# Patient Record
Sex: FEMALE | Race: WHITE | NOT HISPANIC OR LATINO | Employment: FULL TIME | URBAN - METROPOLITAN AREA
[De-identification: names, ages, dates, MRNs, and addresses within clinical notes are randomized per-mention and may not be internally consistent; named-entity substitution may affect disease eponyms.]

---

## 2023-04-10 LAB
COBALAMIN (VITAMIN B12) (PG/ML) IN SER/PLAS: 1028 PG/ML (ref 211–911)
FERRITIN (UG/LL) IN SER/PLAS: 165 UG/L (ref 8–150)
FOLATE (NG/ML) IN SER/PLAS: 9.2 NG/ML
HEPATITIS B VIRUS CORE AB (PRESENCE) IN SER/PLAS BY IMM: NONREACTIVE
HEPATITIS B VIRUS SURFACE AG PRESENCE IN SERUM: NONREACTIVE
IRON (UG/DL) IN SER/PLAS: 133 UG/DL (ref 35–150)
IRON BINDING CAPACITY (UG/DL) IN SER/PLAS: 358 UG/DL (ref 240–445)
IRON SATURATION (%) IN SER/PLAS: 37 % (ref 25–45)

## 2023-04-11 LAB
GENETICS TEST NAME-DATA CONVERSION: NORMAL
HEMOCHROMATOSIS INTERPRETATION: NORMAL
LAB MOLECULAR CA TECHNICAL NOTES: NORMAL
MISCELLANEUOUS TEST RESULT: NORMAL
NAME OF SENDOUT TEST: NORMAL

## 2023-06-01 ENCOUNTER — TELEPHONE (OUTPATIENT)
Dept: PRIMARY CARE | Facility: CLINIC | Age: 58
End: 2023-06-01
Payer: COMMERCIAL

## 2023-06-01 DIAGNOSIS — Z12.31 VISIT FOR SCREENING MAMMOGRAM: ICD-10-CM

## 2023-06-01 NOTE — TELEPHONE ENCOUNTER
Pt called requesting order for a mammogram. Upcoming apt July 2023. Pt would like a call when this is ready.

## 2023-07-07 ENCOUNTER — TELEPHONE (OUTPATIENT)
Dept: PRIMARY CARE | Facility: CLINIC | Age: 58
End: 2023-07-07
Payer: COMMERCIAL

## 2023-07-07 DIAGNOSIS — N89.8 VAGINAL CYST: ICD-10-CM

## 2023-07-07 NOTE — TELEPHONE ENCOUNTER
Pt called asking to speak with you.  She needs directions on the next step to a medical   Issue.  Please call

## 2023-07-07 NOTE — TELEPHONE ENCOUNTER
Spoke with pt, you had done a pa on her last year and pt noted to have cyst on rt labia. Recommendation was made to see gyn . Pt did not see gyn and called today stating that cyst has gotten bigger. Denies drainage , pain odor or redness. Referral placed for gyn. Numbers provided pt also advised if becomes bigger or changes needs to be evaluated at an ER Dr guzmán made aware

## 2023-07-18 DIAGNOSIS — E78.5 HYPERLIPIDEMIA, UNSPECIFIED HYPERLIPIDEMIA TYPE: ICD-10-CM

## 2023-07-18 DIAGNOSIS — Z00.00 ANNUAL PHYSICAL EXAM: ICD-10-CM

## 2023-07-18 RX ORDER — ATORVASTATIN CALCIUM 10 MG/1
1 TABLET, FILM COATED ORAL DAILY
COMMUNITY
Start: 2018-01-08 | End: 2023-07-25 | Stop reason: SDUPTHER

## 2023-07-18 RX ORDER — MONTELUKAST SODIUM 10 MG/1
1 TABLET ORAL DAILY
COMMUNITY
Start: 2013-04-12 | End: 2023-07-25 | Stop reason: SDUPTHER

## 2023-07-21 ENCOUNTER — LAB (OUTPATIENT)
Dept: LAB | Facility: LAB | Age: 58
End: 2023-07-21
Payer: COMMERCIAL

## 2023-07-21 DIAGNOSIS — E78.5 HYPERLIPIDEMIA, UNSPECIFIED HYPERLIPIDEMIA TYPE: ICD-10-CM

## 2023-07-21 DIAGNOSIS — Z00.00 ANNUAL PHYSICAL EXAM: ICD-10-CM

## 2023-07-21 LAB
ALANINE AMINOTRANSFERASE (SGPT) (U/L) IN SER/PLAS: 27 U/L (ref 7–45)
ALBUMIN (G/DL) IN SER/PLAS: 4.9 G/DL (ref 3.4–5)
ALKALINE PHOSPHATASE (U/L) IN SER/PLAS: 50 U/L (ref 33–110)
ANION GAP IN SER/PLAS: 15 MMOL/L (ref 10–20)
ASPARTATE AMINOTRANSFERASE (SGOT) (U/L) IN SER/PLAS: 22 U/L (ref 9–39)
BASOPHILS (10*3/UL) IN BLOOD BY AUTOMATED COUNT: 0.05 X10E9/L (ref 0–0.1)
BASOPHILS/100 LEUKOCYTES IN BLOOD BY AUTOMATED COUNT: 1.2 % (ref 0–2)
BILIRUBIN TOTAL (MG/DL) IN SER/PLAS: 0.6 MG/DL (ref 0–1.2)
CALCIUM (MG/DL) IN SER/PLAS: 10.5 MG/DL (ref 8.6–10.6)
CARBON DIOXIDE, TOTAL (MMOL/L) IN SER/PLAS: 28 MMOL/L (ref 21–32)
CHLORIDE (MMOL/L) IN SER/PLAS: 105 MMOL/L (ref 98–107)
CHOLESTEROL (MG/DL) IN SER/PLAS: 228 MG/DL (ref 0–199)
CHOLESTEROL IN HDL (MG/DL) IN SER/PLAS: 96.8 MG/DL
CHOLESTEROL/HDL RATIO: 2.4
CREATININE (MG/DL) IN SER/PLAS: 0.77 MG/DL (ref 0.5–1.05)
EOSINOPHILS (10*3/UL) IN BLOOD BY AUTOMATED COUNT: 0.3 X10E9/L (ref 0–0.7)
EOSINOPHILS/100 LEUKOCYTES IN BLOOD BY AUTOMATED COUNT: 7 % (ref 0–6)
ERYTHROCYTE DISTRIBUTION WIDTH (RATIO) BY AUTOMATED COUNT: 12.5 % (ref 11.5–14.5)
ERYTHROCYTE MEAN CORPUSCULAR HEMOGLOBIN CONCENTRATION (G/DL) BY AUTOMATED: 32.8 G/DL (ref 32–36)
ERYTHROCYTE MEAN CORPUSCULAR VOLUME (FL) BY AUTOMATED COUNT: 100 FL (ref 80–100)
ERYTHROCYTES (10*6/UL) IN BLOOD BY AUTOMATED COUNT: 4.68 X10E12/L (ref 4–5.2)
FERRITIN (UG/LL) IN SER/PLAS: 262 UG/L (ref 8–150)
GFR FEMALE: 89 ML/MIN/1.73M2
GLUCOSE (MG/DL) IN SER/PLAS: 104 MG/DL (ref 74–99)
HEMATOCRIT (%) IN BLOOD BY AUTOMATED COUNT: 46.6 % (ref 36–46)
HEMOGLOBIN (G/DL) IN BLOOD: 15.3 G/DL (ref 12–16)
IMMATURE GRANULOCYTES/100 LEUKOCYTES IN BLOOD BY AUTOMATED COUNT: 0.2 % (ref 0–0.9)
IRON (UG/DL) IN SER/PLAS: 153 UG/DL (ref 35–150)
IRON BINDING CAPACITY (UG/DL) IN SER/PLAS: 380 UG/DL (ref 240–445)
IRON SATURATION (%) IN SER/PLAS: 40 % (ref 25–45)
LDL: 120 MG/DL (ref 0–99)
LEUKOCYTES (10*3/UL) IN BLOOD BY AUTOMATED COUNT: 4.3 X10E9/L (ref 4.4–11.3)
LYMPHOCYTES (10*3/UL) IN BLOOD BY AUTOMATED COUNT: 2.14 X10E9/L (ref 1.2–4.8)
LYMPHOCYTES/100 LEUKOCYTES IN BLOOD BY AUTOMATED COUNT: 49.8 % (ref 13–44)
MONOCYTES (10*3/UL) IN BLOOD BY AUTOMATED COUNT: 0.47 X10E9/L (ref 0.1–1)
MONOCYTES/100 LEUKOCYTES IN BLOOD BY AUTOMATED COUNT: 10.9 % (ref 2–10)
NEUTROPHILS (10*3/UL) IN BLOOD BY AUTOMATED COUNT: 1.33 X10E9/L (ref 1.2–7.7)
NEUTROPHILS/100 LEUKOCYTES IN BLOOD BY AUTOMATED COUNT: 30.9 % (ref 40–80)
NRBC (PER 100 WBCS) BY AUTOMATED COUNT: 0 /100 WBC (ref 0–0)
PLATELETS (10*3/UL) IN BLOOD AUTOMATED COUNT: 215 X10E9/L (ref 150–450)
POTASSIUM (MMOL/L) IN SER/PLAS: 4.9 MMOL/L (ref 3.5–5.3)
PROTEIN TOTAL: 7.1 G/DL (ref 6.4–8.2)
SODIUM (MMOL/L) IN SER/PLAS: 143 MMOL/L (ref 136–145)
THYROTROPIN (MIU/L) IN SER/PLAS BY DETECTION LIMIT <= 0.05 MIU/L: 1.51 MIU/L (ref 0.44–3.98)
TRIGLYCERIDE (MG/DL) IN SER/PLAS: 54 MG/DL (ref 0–149)
UREA NITROGEN (MG/DL) IN SER/PLAS: 15 MG/DL (ref 6–23)
VLDL: 11 MG/DL (ref 0–40)

## 2023-07-21 PROCEDURE — 84443 ASSAY THYROID STIM HORMONE: CPT

## 2023-07-21 PROCEDURE — 36415 COLL VENOUS BLD VENIPUNCTURE: CPT

## 2023-07-21 PROCEDURE — 80061 LIPID PANEL: CPT

## 2023-07-21 PROCEDURE — 80053 COMPREHEN METABOLIC PANEL: CPT

## 2023-07-21 PROCEDURE — 85025 COMPLETE CBC W/AUTO DIFF WBC: CPT

## 2023-07-25 ENCOUNTER — OFFICE VISIT (OUTPATIENT)
Dept: PRIMARY CARE | Facility: CLINIC | Age: 58
End: 2023-07-25
Payer: COMMERCIAL

## 2023-07-25 VITALS
WEIGHT: 138.5 LBS | BODY MASS INDEX: 20.51 KG/M2 | RESPIRATION RATE: 16 BRPM | HEIGHT: 69 IN | DIASTOLIC BLOOD PRESSURE: 70 MMHG | SYSTOLIC BLOOD PRESSURE: 118 MMHG | TEMPERATURE: 98.8 F | HEART RATE: 72 BPM

## 2023-07-25 DIAGNOSIS — J30.9 ALLERGIC RHINITIS, UNSPECIFIED SEASONALITY, UNSPECIFIED TRIGGER: ICD-10-CM

## 2023-07-25 DIAGNOSIS — Z00.00 ROUTINE GENERAL MEDICAL EXAMINATION AT A HEALTH CARE FACILITY: Primary | ICD-10-CM

## 2023-07-25 DIAGNOSIS — K58.9 IRRITABLE BOWEL SYNDROME, UNSPECIFIED TYPE: ICD-10-CM

## 2023-07-25 DIAGNOSIS — Z12.4 ENCOUNTER FOR PAPANICOLAOU SMEAR FOR CERVICAL CANCER SCREENING: ICD-10-CM

## 2023-07-25 DIAGNOSIS — E78.5 HYPERLIPIDEMIA, UNSPECIFIED HYPERLIPIDEMIA TYPE: ICD-10-CM

## 2023-07-25 PROBLEM — E83.110 HEREDITARY HEMOCHROMATOSIS (CMS-HCC): Status: ACTIVE | Noted: 2023-07-25

## 2023-07-25 PROBLEM — C43.9 MELANOMA (MULTI): Status: ACTIVE | Noted: 2023-07-25

## 2023-07-25 PROBLEM — R79.9 ABNORMAL BLOOD CHEMISTRY: Status: ACTIVE | Noted: 2023-07-25

## 2023-07-25 PROCEDURE — 88175 CYTOPATH C/V AUTO FLUID REDO: CPT

## 2023-07-25 PROCEDURE — 1036F TOBACCO NON-USER: CPT | Performed by: INTERNAL MEDICINE

## 2023-07-25 PROCEDURE — 99396 PREV VISIT EST AGE 40-64: CPT | Performed by: INTERNAL MEDICINE

## 2023-07-25 RX ORDER — DICYCLOMINE HYDROCHLORIDE 20 MG/1
20 TABLET ORAL 3 TIMES DAILY
Qty: 90 TABLET | Refills: 1 | Status: SHIPPED | OUTPATIENT
Start: 2023-07-25

## 2023-07-25 RX ORDER — ATORVASTATIN CALCIUM 10 MG/1
10 TABLET, FILM COATED ORAL DAILY
Qty: 90 TABLET | Refills: 1 | Status: SHIPPED | OUTPATIENT
Start: 2023-07-25 | End: 2024-01-19 | Stop reason: SDUPTHER

## 2023-07-25 RX ORDER — MONTELUKAST SODIUM 10 MG/1
10 TABLET ORAL DAILY
Qty: 90 TABLET | Refills: 1 | Status: SHIPPED | OUTPATIENT
Start: 2023-07-25 | End: 2024-01-19 | Stop reason: SDUPTHER

## 2023-07-25 RX ORDER — DICYCLOMINE HYDROCHLORIDE 20 MG/1
20 TABLET ORAL 3 TIMES DAILY
COMMUNITY
Start: 2019-10-30 | End: 2023-07-25 | Stop reason: SDUPTHER

## 2023-07-25 ASSESSMENT — PATIENT HEALTH QUESTIONNAIRE - PHQ9
2. FEELING DOWN, DEPRESSED OR HOPELESS: NOT AT ALL
SUM OF ALL RESPONSES TO PHQ9 QUESTIONS 1 AND 2: 0
1. LITTLE INTEREST OR PLEASURE IN DOING THINGS: NOT AT ALL

## 2023-07-25 ASSESSMENT — COLUMBIA-SUICIDE SEVERITY RATING SCALE - C-SSRS
6. HAVE YOU EVER DONE ANYTHING, STARTED TO DO ANYTHING, OR PREPARED TO DO ANYTHING TO END YOUR LIFE?: NO
1. IN THE PAST MONTH, HAVE YOU WISHED YOU WERE DEAD OR WISHED YOU COULD GO TO SLEEP AND NOT WAKE UP?: NO
2. HAVE YOU ACTUALLY HAD ANY THOUGHTS OF KILLING YOURSELF?: NO

## 2023-07-25 NOTE — PROGRESS NOTES
"Subjective   Patient ID: Alessandra Beltran is a 57 y.o. female who presents for Annual Exam (With pap and breast exam).  HPI  Patient is here for annual check-up    Last well check 1 yr  Gets reg melanoma follow up  Reported health good    Dental  check  reg     Vision check reg   Vision issues reg  Hearing issues no  Vaccines UTD  y    Diet good  Exercise regular  Caffeine am 2 c  Alcohol yes  Tobacco never     Colon cancer screening  8/2021    Current issues:  saw gyn and had cyst emoved from right labiua there x yrs but it grew      Review of Systems  GENERAL - Denies fever, fatigue or chills  SKIN - Denies rash, new skin lesions, or change in moles  EYES - Denies blurred vision, or change in visual acuity  EARS - Denies ear pain, discharge, ringing, or difficulty hearing  NOSE - Denies nasal congestion, discharge, or bleeding  MOUTH - Denies sore throat, postnasal drip or painful/difficulty swallowing  NECK - Denies pain or swelling  RESPIRATORY - Denies shortness of breath, cough, wheezing  CARDIOVASCULAR - Denies palpitations, chest pain, orthopnea, peripheral edema, syncope or claudication  GASTROINTESTINAL - Denies nausea, vomiting, diarrhea, constipation, abdominal pain, melena and or bright red blood  GENITOURINARY - Denies dysuria, frequency of urination, urgency, or hesitancy  MUSCULOSKELETAL - Denies joint or muscle pain, or back pain  NEUROLOGICAL - Denies localized numbness, weakness, or tingling  PSYCHIATRIC - Denies depression, anxiety, substance abuse, suicidal or homicidal ideation  ENDOCRINE - Denies heat or cold intolerance, weight loss or gain, increasing thirst  HEMATO-IMMUNOLOGIC - Denies easy bruising, bleeding, oral ulcerations or recurrent infections      Objective   /70   Pulse 72   Temp 37.1 °C (98.8 °F)   Resp 16   Ht 1.746 m (5' 8.75\")   Wt 62.8 kg (138 lb 8 oz)   BMI 20.60 kg/m²    Physical Exam  CONSTITUTIONAL - well nourished, well developed, looks like stated age, in " no acute distress, not ill-appearing, and not tired appearing  SKIN -notable scars from melanoma resection on her right labia she has a nodule at the site of the resection of her leiomyoma   HEAD - no trauma, normocephalic  EYES -normal  ENT - TM's intact, no injection, no exudate,  nasal passage without discharge and patent  NECK - supple without rigidity, no neck mass was observed, no thyromegaly or thyroid nodules  CHEST - clear to auscultation, no wheezing, no crackles and no rales, good effort  Breast normal bilaterally no masses no axillary masses  CARDIAC - regular rate and regular rhythm, no skipped beats, no murmur  ABDOMEN - no organomegaly, soft, nontender, nondistended, normal bowel sounds, no guarding/rebound/rigidity  External/internal genitalia normal cervix normal bimanual normal  EXTREMITIES - no edema, no deformities  NEUROLOGICAL -grossly intact  PSYCHIATRIC - alert, pleasant and cordial, age-appropriate  LYMPHATIC- no cervical lymphadenopathy    Assessment/Plan   Diagnoses and all orders for this visit:  Routine general medical examination at a health care facility  Hyperlipidemia, unspecified hyperlipidemia type  -     atorvastatin (Lipitor) 10 mg tablet; Take 1 tablet (10 mg) by mouth once daily.  -     Lipid Panel; Future  -     Comprehensive Metabolic Panel; Future  Allergic rhinitis, unspecified seasonality, unspecified trigger  -     montelukast (Singulair) 10 mg tablet; Take 1 tablet (10 mg) by mouth once daily.  Irritable bowel syndrome, unspecified type  -     dicyclomine (Bentyl) 20 mg tablet; Take 1 tablet (20 mg) by mouth 3 times a day.    Call with issues  Follow-up with me in 6 months  Blood work order written  She will follow-up with gynecologist next year for Pap  We reviewed her mammo  She is up-to-date on her colonoscopy  Charity Mccallum MD

## 2023-08-02 LAB
COMPLETE PATHOLOGY REPORT: NORMAL
CONVERTED CLINICAL DIAGNOSIS-HISTORY: NORMAL
CONVERTED DIAGNOSIS COMMENT: NORMAL
CONVERTED FINAL DIAGNOSIS: NORMAL
CONVERTED FINAL REPORT PDF LINK TO COPY AND PASTE: NORMAL

## 2023-10-23 ENCOUNTER — TELEPHONE (OUTPATIENT)
Dept: HEMATOLOGY/ONCOLOGY | Facility: CLINIC | Age: 58
End: 2023-10-23
Payer: COMMERCIAL

## 2023-10-23 NOTE — TELEPHONE ENCOUNTER
Patient received yellow jacket stings on Friday.  On prednisone and doxycycline due to infection per patient.  Day 4/9.  Scheduled for labs tomorrow and follow up on 11/7.  Wanted to know if any labs would be skewed from these two meds.  After d/w patient she will have labs done next week since appt following week.  Call back instructions reviewed.  Patient verbalized understanding.

## 2023-10-23 NOTE — TELEPHONE ENCOUNTER
Dr. Chamorro patient. Patient called is questioning if she should have the fasting labs tomorrow because she is on steroids and a antibiotic from her PCP and didn't know if it would change the outcome of the labs. Please call her @ [phone]

## 2023-10-30 PROBLEM — L91.8 SKIN TAG: Status: ACTIVE | Noted: 2018-08-06

## 2023-10-30 PROBLEM — L72.3 SEBACEOUS CYST: Status: ACTIVE | Noted: 2019-02-14

## 2023-10-30 PROBLEM — K76.9 LIVER LESION: Status: ACTIVE | Noted: 2023-10-30

## 2023-10-30 PROBLEM — L70.9 ACNE, UNSPECIFIED: Status: ACTIVE | Noted: 2018-08-06

## 2023-10-30 PROBLEM — D23.9 DERMATOFIBROMA: Status: ACTIVE | Noted: 2018-08-06

## 2023-10-30 PROBLEM — R05.9 COUGH: Status: ACTIVE | Noted: 2023-10-30

## 2023-10-30 PROBLEM — D03.62 MELANOMA IN SITU OF LEFT UPPER LIMB, INCLUDING SHOULDER (MULTI): Status: ACTIVE | Noted: 2018-08-06

## 2023-10-30 PROBLEM — L81.4 OTHER MELANIN HYPERPIGMENTATION: Status: ACTIVE | Noted: 2018-08-06

## 2023-10-30 PROBLEM — Z12.83 SCREENING FOR MALIGNANT NEOPLASM OF SKIN: Status: ACTIVE | Noted: 2018-08-06

## 2023-10-30 PROBLEM — L81.4 LENTIGINES: Status: ACTIVE | Noted: 2018-08-06

## 2023-10-30 PROBLEM — R79.89 ABNORMAL CBC: Status: ACTIVE | Noted: 2023-10-30

## 2023-10-30 PROBLEM — L82.1 SEBORRHEIC KERATOSIS: Status: ACTIVE | Noted: 2018-08-06

## 2023-10-30 PROBLEM — R19.5 LOOSE STOOLS: Status: ACTIVE | Noted: 2023-10-30

## 2023-10-30 PROBLEM — D18.00 ANGIOMA: Status: ACTIVE | Noted: 2018-08-06

## 2023-10-30 PROBLEM — D22.70 MELANOCYTIC NEVI OF UNSPECIFIED LOWER LIMB, INCLUDING HIP: Status: ACTIVE | Noted: 2018-08-06

## 2023-10-30 PROBLEM — D36.7 BENIGN NEOPLASM OF TRUNK: Status: ACTIVE | Noted: 2018-08-06

## 2023-10-30 PROBLEM — D36.7 BENIGN NEOPLASM OF LOWER EXTREMITY: Status: ACTIVE | Noted: 2018-08-06

## 2023-10-30 PROBLEM — L90.5 SCAR CONDITION AND FIBROSIS OF SKIN: Status: ACTIVE | Noted: 2018-08-06

## 2023-10-30 PROBLEM — Z85.820 HISTORY OF MELANOMA: Status: ACTIVE | Noted: 2018-08-06

## 2023-10-30 PROBLEM — D36.7 BENIGN NEOPLASM OF UPPER EXTREMITY: Status: ACTIVE | Noted: 2018-08-06

## 2023-10-30 PROBLEM — D36.7 BENIGN NEOPLASM OF FACE: Status: ACTIVE | Noted: 2018-08-06

## 2023-10-30 PROBLEM — J30.9 ALLERGIC RHINITIS: Status: ACTIVE | Noted: 2023-10-30

## 2023-10-30 PROBLEM — D22.62 MELANOCYTIC NEVI OF LEFT UPPER LIMB, INCLUDING SHOULDER: Status: ACTIVE | Noted: 2018-08-06

## 2023-10-30 PROBLEM — J01.90 ACUTE SINUSITIS: Status: ACTIVE | Noted: 2023-10-30

## 2023-10-30 PROBLEM — M65.30 TRIGGER FINGER: Status: ACTIVE | Noted: 2023-10-30

## 2023-10-30 RX ORDER — SULFAMETHOXAZOLE AND TRIMETHOPRIM 800; 160 MG/1; MG/1
1 TABLET ORAL 2 TIMES DAILY
COMMUNITY
Start: 2023-07-13 | End: 2024-01-19 | Stop reason: ALTCHOICE

## 2023-10-30 RX ORDER — ACETAMINOPHEN, DEXTROMETHORPHAN HBR, DOXYLAMINE SUCCINATE, PHENYLEPHRINE HCL 650; 20; 12.5; 1 MG/30ML; MG/30ML; MG/30ML; MG/30ML
1 SOLUTION ORAL DAILY
COMMUNITY

## 2023-10-30 RX ORDER — ACETAMINOPHEN 500 MG
2000 TABLET ORAL DAILY
COMMUNITY

## 2023-10-30 RX ORDER — MOMETASONE FUROATE 50 UG/1
1 SPRAY, METERED NASAL DAILY
COMMUNITY

## 2023-10-30 RX ORDER — CIPROFLOXACIN 500 MG/1
500 TABLET ORAL 2 TIMES DAILY
COMMUNITY
Start: 2023-08-18 | End: 2023-08-28

## 2023-10-30 RX ORDER — TRIAMCINOLONE ACETONIDE 55 UG/1
1 SPRAY, METERED NASAL DAILY
COMMUNITY
Start: 2014-11-10 | End: 2024-01-19 | Stop reason: ALTCHOICE

## 2023-10-30 RX ORDER — BIOTIN 1 MG
1000 TABLET ORAL DAILY
COMMUNITY
End: 2024-01-19 | Stop reason: ALTCHOICE

## 2023-10-30 RX ORDER — SULFACETAMIDE SODIUM, SULFUR 100; 50 MG/G; MG/G
1 EMULSION TOPICAL DAILY
COMMUNITY
Start: 2015-07-20 | End: 2024-01-19 | Stop reason: ALTCHOICE

## 2023-10-30 RX ORDER — PREDNISONE 10 MG/1
10 TABLET ORAL DAILY
COMMUNITY
Start: 2023-10-20 | End: 2024-01-19 | Stop reason: ALTCHOICE

## 2023-10-30 RX ORDER — DOXYCYCLINE 100 MG/1
100 CAPSULE ORAL 2 TIMES DAILY
COMMUNITY
Start: 2023-10-20 | End: 2024-01-19 | Stop reason: ALTCHOICE

## 2023-10-30 RX ORDER — CALCIUM CARBONATE 600 MG
600 TABLET ORAL AS NEEDED
COMMUNITY

## 2023-10-30 RX ORDER — MOMETASONE FUROATE 50 UG/1
2 SPRAY, METERED NASAL AS NEEDED
COMMUNITY
End: 2024-01-19 | Stop reason: DRUGHIGH

## 2023-10-31 ENCOUNTER — LAB (OUTPATIENT)
Dept: LAB | Facility: LAB | Age: 58
End: 2023-10-31
Payer: COMMERCIAL

## 2023-10-31 DIAGNOSIS — D75.89 OTHER SPECIFIED DISEASES OF BLOOD AND BLOOD-FORMING ORGANS: Primary | ICD-10-CM

## 2023-10-31 DIAGNOSIS — E83.19 OTHER DISORDERS OF IRON METABOLISM: ICD-10-CM

## 2023-10-31 DIAGNOSIS — E83.110 HEREDITARY HEMOCHROMATOSIS (CMS-HCC): ICD-10-CM

## 2023-10-31 LAB
BASOPHILS # BLD AUTO: 0.03 X10*3/UL (ref 0–0.1)
BASOPHILS NFR BLD AUTO: 0.6 %
EOSINOPHIL # BLD AUTO: 0.11 X10*3/UL (ref 0–0.7)
EOSINOPHIL NFR BLD AUTO: 2.3 %
ERYTHROCYTE [DISTWIDTH] IN BLOOD BY AUTOMATED COUNT: 12.5 % (ref 11.5–14.5)
FERRITIN SERPL-MCNC: 222 NG/ML (ref 8–150)
HCT VFR BLD AUTO: 46.7 % (ref 36–46)
HGB BLD-MCNC: 15.4 G/DL (ref 12–16)
IMM GRANULOCYTES # BLD AUTO: 0.02 X10*3/UL (ref 0–0.7)
IMM GRANULOCYTES NFR BLD AUTO: 0.4 % (ref 0–0.9)
IRON SATN MFR SERPL: 45 % (ref 25–45)
IRON SERPL-MCNC: 161 UG/DL (ref 35–150)
LYMPHOCYTES # BLD AUTO: 2.22 X10*3/UL (ref 1.2–4.8)
LYMPHOCYTES NFR BLD AUTO: 46.8 %
MCH RBC QN AUTO: 33.3 PG (ref 26–34)
MCHC RBC AUTO-ENTMCNC: 33 G/DL (ref 32–36)
MCV RBC AUTO: 101 FL (ref 80–100)
MONOCYTES # BLD AUTO: 0.42 X10*3/UL (ref 0.1–1)
MONOCYTES NFR BLD AUTO: 8.9 %
NEUTROPHILS # BLD AUTO: 1.94 X10*3/UL (ref 1.2–7.7)
NEUTROPHILS NFR BLD AUTO: 41 %
NRBC BLD-RTO: 0 /100 WBCS (ref 0–0)
PLATELET # BLD AUTO: 201 X10*3/UL (ref 150–450)
PMV BLD AUTO: 10 FL (ref 7.5–11.5)
RBC # BLD AUTO: 4.63 X10*6/UL (ref 4–5.2)
TIBC SERPL-MCNC: 355 UG/DL (ref 240–445)
UIBC SERPL-MCNC: 194 UG/DL (ref 110–370)
WBC # BLD AUTO: 4.7 X10*3/UL (ref 4.4–11.3)

## 2023-10-31 PROCEDURE — 83550 IRON BINDING TEST: CPT

## 2023-10-31 PROCEDURE — 83540 ASSAY OF IRON: CPT

## 2023-10-31 PROCEDURE — 36415 COLL VENOUS BLD VENIPUNCTURE: CPT

## 2023-10-31 PROCEDURE — 82728 ASSAY OF FERRITIN: CPT

## 2023-10-31 PROCEDURE — 85025 COMPLETE CBC W/AUTO DIFF WBC: CPT

## 2023-11-03 DIAGNOSIS — E83.110 HEREDITARY HEMOCHROMATOSIS (CMS-HCC): Primary | ICD-10-CM

## 2023-11-03 RX ORDER — FAMOTIDINE 10 MG/ML
20 INJECTION INTRAVENOUS ONCE AS NEEDED
OUTPATIENT
Start: 2023-11-07

## 2023-11-03 RX ORDER — EPINEPHRINE 0.3 MG/.3ML
0.3 INJECTION SUBCUTANEOUS EVERY 5 MIN PRN
OUTPATIENT
Start: 2023-11-07

## 2023-11-03 RX ORDER — ALBUTEROL SULFATE 0.83 MG/ML
3 SOLUTION RESPIRATORY (INHALATION) AS NEEDED
OUTPATIENT
Start: 2023-11-07

## 2023-11-03 RX ORDER — DIPHENHYDRAMINE HYDROCHLORIDE 50 MG/ML
50 INJECTION INTRAMUSCULAR; INTRAVENOUS AS NEEDED
OUTPATIENT
Start: 2023-11-07

## 2023-11-07 ENCOUNTER — INFUSION (OUTPATIENT)
Dept: HEMATOLOGY/ONCOLOGY | Facility: CLINIC | Age: 58
End: 2023-11-07
Payer: COMMERCIAL

## 2023-11-07 ENCOUNTER — OFFICE VISIT (OUTPATIENT)
Dept: HEMATOLOGY/ONCOLOGY | Facility: CLINIC | Age: 58
End: 2023-11-07
Payer: COMMERCIAL

## 2023-11-07 VITALS
BODY MASS INDEX: 20.9 KG/M2 | HEART RATE: 72 BPM | TEMPERATURE: 97.2 F | SYSTOLIC BLOOD PRESSURE: 150 MMHG | OXYGEN SATURATION: 99 % | HEIGHT: 69 IN | DIASTOLIC BLOOD PRESSURE: 92 MMHG | WEIGHT: 141.09 LBS

## 2023-11-07 DIAGNOSIS — E83.110 HEREDITARY HEMOCHROMATOSIS (CMS-HCC): ICD-10-CM

## 2023-11-07 DIAGNOSIS — E83.110 HEREDITARY HEMOCHROMATOSIS (CMS-HCC): Primary | ICD-10-CM

## 2023-11-07 PROCEDURE — 99213 OFFICE O/P EST LOW 20 MIN: CPT | Performed by: INTERNAL MEDICINE

## 2023-11-07 PROCEDURE — 1036F TOBACCO NON-USER: CPT | Performed by: INTERNAL MEDICINE

## 2023-11-07 ASSESSMENT — PATIENT HEALTH QUESTIONNAIRE - PHQ9
9. THOUGHTS THAT YOU WOULD BE BETTER OFF DEAD, OR OF HURTING YOURSELF: NOT AT ALL
4. FEELING TIRED OR HAVING LITTLE ENERGY: NOT AT ALL
8. MOVING OR SPEAKING SO SLOWLY THAT OTHER PEOPLE COULD HAVE NOTICED. OR THE OPPOSITE, BEING SO FIGETY OR RESTLESS THAT YOU HAVE BEEN MOVING AROUND A LOT MORE THAN USUAL: 0
7. TROUBLE CONCENTRATING ON THINGS, SUCH AS READING THE NEWSPAPER OR WATCHING TELEVISION: NOT AT ALL
5. POOR APPETITE OR OVEREATING: NOT AT ALL
9. THOUGHTS THAT YOU WOULD BE BETTER OFF DEAD, OR OF HURTING YOURSELF: 0
8. MOVING OR SPEAKING SO SLOWLY THAT OTHER PEOPLE COULD HAVE NOTICED. OR THE OPPOSITE, BEING SO FIGETY OR RESTLESS THAT YOU HAVE BEEN MOVING AROUND A LOT MORE THAN USUAL: NOT AT ALL
SUM OF ALL RESPONSES TO PHQ QUESTIONS 1-9: 0
2. FEELING DOWN, DEPRESSED OR HOPELESS: NOT AT ALL
3. TROUBLE FALLING OR STAYING ASLEEP OR SLEEPING TOO MUCH: NOT AT ALL
2. FEELING DOWN, DEPRESSED, IRRITABLE, OR HOPELESS: 0
5. POOR APPETITE OR OVEREATING: NOT AT ALL
3. TROUBLE FALLING OR STAYING ASLEEP OR SLEEPING TOO MUCH: NOT AT ALL
6. FEELING BAD ABOUT YOURSELF - OR THAT YOU ARE A FAILURE OR HAVE LET YOURSELF OR YOUR FAMILY DOWN: NOT AT ALL
4. FEELING TIRED OR HAVING LITTLE ENERGY: NOT AT ALL
5. POOR APPETITE OR OVEREATING: 0
6. FEELING BAD ABOUT YOURSELF - OR THAT YOU ARE A FAILURE OR HAVE LET YOURSELF OR YOUR FAMILY DOWN: NOT AT ALL
1. LITTLE INTEREST OR PLEASURE IN DOING THINGS: NOT AT ALL
7. TROUBLE CONCENTRATING ON THINGS, SUCH AS READING THE NEWSPAPER OR WATCHING TELEVISION: NOT AT ALL
6. FEELING BAD ABOUT YOURSELF - OR THAT YOU ARE A FAILURE OR HAVE LET YOURSELF OR YOUR FAMILY DOWN: 0
8. MOVING OR SPEAKING SO SLOWLY THAT OTHER PEOPLE COULD HAVE NOTICED. OR THE OPPOSITE, BEING SO FIGETY OR RESTLESS THAT YOU HAVE BEEN MOVING AROUND A LOT MORE THAN USUAL: NOT AT ALL
1. LITTLE INTEREST OR PLEASURE IN DOING THINGS: 0
2. FEELING DOWN, DEPRESSED, IRRITABLE, OR HOPELESS: NOT AT ALL
2. FEELING DOWN, DEPRESSED OR HOPELESS: NOT AT ALL
SUM OF ALL RESPONSES TO PHQ9 QUESTIONS 1 AND 2: 0
SUM OF ALL RESPONSES TO PHQ QUESTIONS 1-9: 0
1. LITTLE INTEREST OR PLEASURE IN DOING THINGS: NOT AT ALL
4. FEELING TIRED OR HAVING LITTLE ENERGY: NOT AT ALL
9. THOUGHTS THAT YOU WOULD BE BETTER OFF DEAD, OR OF HURTING YOURSELF: NOT AT ALL
4. FEELING TIRED OR HAVING LITTLE ENERGY: 0
5. POOR APPETITE OR OVEREATING: 0
6. FEELING BAD ABOUT YOURSELF - OR THAT YOU ARE A FAILURE OR HAVE LET YOURSELF OR YOUR FAMILY DOWN: NOT AT ALL
6. FEELING BAD ABOUT YOURSELF - OR THAT YOU ARE A FAILURE OR HAVE LET YOURSELF OR YOUR FAMILY DOWN: 0
8. MOVING OR SPEAKING SO SLOWLY THAT OTHER PEOPLE COULD HAVE NOTICED. OR THE OPPOSITE, BEING SO FIGETY OR RESTLESS THAT YOU HAVE BEEN MOVING AROUND A LOT MORE THAN USUAL: 0
SUM OF ALL RESPONSES TO PHQ QUESTIONS 1-9: 0
7. TROUBLE CONCENTRATING ON THINGS, SUCH AS READING THE NEWSPAPER OR WATCHING TELEVISION: NOT AT ALL
1. LITTLE INTEREST OR PLEASURE IN DOING THINGS: NOT AT ALL
2. FEELING DOWN, DEPRESSED, IRRITABLE, OR HOPELESS: NOT AT ALL
2. FEELING DOWN, DEPRESSED, IRRITABLE, OR HOPELESS: 0
4. FEELING TIRED OR HAVING LITTLE ENERGY: NOT AT ALL
5. POOR APPETITE OR OVEREATING: NOT AT ALL
7. TROUBLE CONCENTRATING ON THINGS, SUCH AS READING THE NEWSPAPER OR WATCHING TELEVISION: NOT AT ALL
3. TROUBLE FALLING OR STAYING ASLEEP OR SLEEPING TOO MUCH: NOT AT ALL
5. POOR APPETITE OR OVEREATING: NOT AT ALL
3. TROUBLE FALLING OR STAYING ASLEEP OR SLEEPING TOO MUCH: NOT AT ALL
9. THOUGHTS THAT YOU WOULD BE BETTER OFF DEAD, OR OF HURTING YOURSELF: 0
7. TROUBLE CONCENTRATING ON THINGS, SUCH AS READING THE NEWSPAPER OR WATCHING TELEVISION: 0
4. FEELING TIRED OR HAVING LITTLE ENERGY: 0
3. TROUBLE FALLING OR STAYING ASLEEP OR SLEEPING TOO MUCH: 0
SUM OF ALL RESPONSES TO PHQ QUESTIONS 1-9: 0
6. FEELING BAD ABOUT YOURSELF - OR THAT YOU ARE A FAILURE OR HAVE LET YOURSELF OR YOUR FAMILY DOWN: NOT AT ALL
3. TROUBLE FALLING OR STAYING ASLEEP OR SLEEPING TOO MUCH: 0
2. FEELING DOWN, DEPRESSED OR HOPELESS: NOT AT ALL
7. TROUBLE CONCENTRATING ON THINGS, SUCH AS READING THE NEWSPAPER OR WATCHING TELEVISION: 0
8. MOVING OR SPEAKING SO SLOWLY THAT OTHER PEOPLE COULD HAVE NOTICED. OR THE OPPOSITE, BEING SO FIGETY OR RESTLESS THAT YOU HAVE BEEN MOVING AROUND A LOT MORE THAN USUAL: NOT AT ALL
1. LITTLE INTEREST OR PLEASURE IN DOING THINGS: 0
9. THOUGHTS THAT YOU WOULD BE BETTER OFF DEAD, OR OF HURTING YOURSELF: NOT AT ALL
8. MOVING OR SPEAKING SO SLOWLY THAT OTHER PEOPLE COULD HAVE NOTICED. OR THE OPPOSITE, BEING SO FIGETY OR RESTLESS THAT YOU HAVE BEEN MOVING AROUND A LOT MORE THAN USUAL: NOT AT ALL
9. THOUGHTS THAT YOU WOULD BE BETTER OFF DEAD, OR OF HURTING YOURSELF: NOT AT ALL

## 2023-11-07 ASSESSMENT — COLUMBIA-SUICIDE SEVERITY RATING SCALE - C-SSRS
1. IN THE PAST MONTH, HAVE YOU WISHED YOU WERE DEAD OR WISHED YOU COULD GO TO SLEEP AND NOT WAKE UP?: NO
6. HAVE YOU EVER DONE ANYTHING, STARTED TO DO ANYTHING, OR PREPARED TO DO ANYTHING TO END YOUR LIFE?: NO
2. HAVE YOU ACTUALLY HAD ANY THOUGHTS OF KILLING YOURSELF?: NO

## 2023-11-07 ASSESSMENT — PAIN SCALES - GENERAL: PAINLEVEL: 0-NO PAIN

## 2023-11-07 ASSESSMENT — ENCOUNTER SYMPTOMS
LOSS OF SENSATION IN FEET: 0
OCCASIONAL FEELINGS OF UNSTEADINESS: 0
DEPRESSION: 0

## 2023-11-07 NOTE — PROGRESS NOTES
No phlebotomy today.  Patient reminded to follow-up with PCP and monitor BP at home.  Follow-up in 6 months with labs prior outside.  Call back instructions reviewed.  Patient verbalized understanding.

## 2023-11-07 NOTE — PROGRESS NOTES
Patient ID: Alessandra Beltran is a 57 y.o. female.  Referring Physician: No referring provider defined for this encounter.  Primary Care Provider: Charity Mccallum MD        Patient Instructions:   Patient was advised to monitor blood pressure at home and follow-up with primary care physician.    no Phlebotomy at this time   recheck CBC, iron group and ferritin (fasting) in 6 months  Return for follow-up in 2 days after lab work       ASSESSMENT, PROBLEM LIST, DECISION MAKING, PLAN.    1. Hereditary hemochromatosis/heterozygous C282 Y mutation diagnosed in April 2023 patient is on watchful waiting As her ferritin is 165      PAST MEDICAL HISTORY:    Recurrent superficial melanoma for which she had several excisions going back to 2010 when it was at  and recently has been followed by dermatologist at [facility], history of basal cell skin cancer removed, peptic ulcer disease, false positive hepatitis C in the past but it was confirmatory test hepatitis C negative at  in January 2018 repeat hepatitis C testing in April 2022 was negative, history of anxiety,          INTERVAL HISTORY :   Patient returns today for follow-up after she had a work-up done for hemochromatosis.          PHYSICAL EXAM:  General: Conscious, alert, oriented x3, not in acute distress.  HEENT:    No icterus.    Chest:Bilateral symmetrical,     CVS:  S1, S2.    Abdomen:  Soft, no palpable mass   Extremities: No clubbing, cyanosis,     Skin: No petechial rash.            ASSESSMENT & PLAN:  Patient with mild persistently elevated iron saturation and ferritin had a hemochromatosis profile checked and she is heterozygous for hemochromatosis C282 Y mutation.    She is clinically doing well, has no sign or symptoms of end organ damage related to hemochromatosis,   Her ferritin is stable   This time there is no role of phlebotomy, we will continue watchful waiting  Advised to watch her diet  We will start phlebotomy if her ferritin start going  more than 300 ng/mL especially unless her ferritin goes above 1000, there is very little risk.  Patient understands  Reevaluate with repeat lab work in 6 months  Advised to call if there is any new problem        advised to avoid excessive oral iron intake including significant alcohol use.     We will periodically check her iron studies and will do phlebotomy if needed.             LABS:  Lab on 10/31/2023   Component Date Value Ref Range Status    Ferritin 10/31/2023 222 (H)  8 - 150 ng/mL Final    Iron 10/31/2023 161 (H)  35 - 150 ug/dL Final    UIBC 10/31/2023 194  110 - 370 ug/dL Final    TIBC 10/31/2023 355  240 - 445 ug/dL Final    % Saturation 10/31/2023 45  25 - 45 % Final    WBC 10/31/2023 4.7  4.4 - 11.3 x10*3/uL Final    nRBC 10/31/2023 0.0  0.0 - 0.0 /100 WBCs Final    RBC 10/31/2023 4.63  4.00 - 5.20 x10*6/uL Final    Hemoglobin 10/31/2023 15.4  12.0 - 16.0 g/dL Final    Hematocrit 10/31/2023 46.7 (H)  36.0 - 46.0 % Final    MCV 10/31/2023 101 (H)  80 - 100 fL Final    MCH 10/31/2023 33.3  26.0 - 34.0 pg Final    MCHC 10/31/2023 33.0  32.0 - 36.0 g/dL Final    RDW 10/31/2023 12.5  11.5 - 14.5 % Final    Platelets 10/31/2023 201  150 - 450 x10*3/uL Final    MPV 10/31/2023 10.0  7.5 - 11.5 fL Final    Neutrophils % 10/31/2023 41.0  40.0 - 80.0 % Final    Immature Granulocytes %, Automated 10/31/2023 0.4  0.0 - 0.9 % Final    Lymphocytes % 10/31/2023 46.8  13.0 - 44.0 % Final    Monocytes % 10/31/2023 8.9  2.0 - 10.0 % Final    Eosinophils % 10/31/2023 2.3  0.0 - 6.0 % Final    Basophils % 10/31/2023 0.6  0.0 - 2.0 % Final    Neutrophils Absolute 10/31/2023 1.94  1.20 - 7.70 x10*3/uL Final    Immature Granulocytes Absolute, Au* 10/31/2023 0.02  0.00 - 0.70 x10*3/uL Final    Lymphocytes Absolute 10/31/2023 2.22  1.20 - 4.80 x10*3/uL Final    Monocytes Absolute 10/31/2023 0.42  0.10 - 1.00 x10*3/uL Final    Eosinophils Absolute 10/31/2023 0.11  0.00 - 0.70 x10*3/uL Final    Basophils Absolute 10/31/2023  "0.03  0.00 - 0.10 x10*3/uL Final       IMAGING:  No results found.    VITALS: BSA: 1.76 meters squared BP (!) 150/92   Pulse 72   Temp 36.2 °C (97.2 °F) (Temporal)   Ht 1.746 m (5' 8.74\")   Wt 64 kg (141 lb 1.5 oz)   SpO2 99%   BMI 20.99 kg/m²     Current Outpatient Medications   Medication Sig Dispense Refill    atorvastatin (Lipitor) 10 mg tablet Take 1 tablet (10 mg) by mouth once daily. 90 tablet 1    calcium carbonate 600 mg calcium (1,500 mg) tablet Take 1 tablet (600 mg) by mouth if needed.      cholecalciferol (Vitamin D-3) 50 mcg (2,000 unit) capsule Take 1 capsule (2,000 Units) by mouth early in the morning..      cyanocobalamin, vitamin B-12, (Vitamin B-12) 1,000 mcg tablet extended release Take 1 tablet (1,000 mcg) by mouth once daily.      dicyclomine (Bentyl) 20 mg tablet Take 1 tablet (20 mg) by mouth 3 times a day. 90 tablet 1    loratadine 10 mg capsule Take 10 mg by mouth once daily.      mometasone (Nasonex) 50 mcg/actuation nasal spray Administer 2 sprays into each nostril if needed (PRMN).      mometasone (Nasonex) 50 mcg/actuation nasal spray Administer 1 spray into each nostril once daily.      montelukast (Singulair) 10 mg tablet Take 1 tablet (10 mg) by mouth once daily. 90 tablet 1    biotin 1 mg tablet Take 1 tablet (1,000 mcg) by mouth once daily.      clindamycin-benzoyl peroxide 1.2 %(1 % base) -3.75 % gel Apply 1 Application topically once daily.      doxycycline (Vibramycin) 100 mg capsule Take 1 capsule (100 mg) by mouth 2 times a day. TAKE 2 CAPSULES BY MOUTH TO START THEN TAKE 1 CAPSULE BY MOUTH TWICE DAILY UNTIL FINISHED      multivitamin with minerals capsule Take 1 tablet by mouth once daily.      predniSONE (Deltasone) 10 mg tablet Take 1 tablet (10 mg) by mouth once daily. TAKE 3 TABLETS BY MOUTH ONCE DAILY FOR 3 DAYS THEN TAKE 2 TABLETS ONCE DAILY FOR 3 DAYS THEN TAKE 1 TABLET DAILY FOR 3 DAYS      sulfacetamide sodium-sulfur (Avar, Plexion) 10-5 % (w/w) topical " emulsion Apply 1 Application topically once daily.      sulfamethoxazole-trimethoprim (Bactrim DS) 800-160 mg tablet Take 1 tablet by mouth 2 times a day.      triamcinolone (Nasacort) 55 mcg nasal inhaler Administer 1 spray into each nostril once daily.       No current facility-administered medications for this visit.       ALLERGY: Amoxicillin and Erythromycin    SOCIAL HISTORY: She  has no history on file for alcohol use. She  reports no history of drug use.    Allergy none although has intolerance to erythromycin causes GERD symptoms    Significant family history of melanoma in mother, brother, paternal grandfather  of liver problems in his 70s    Denies smoking, drinks 2 glasses of wine per night, denies any illicit drug use(1)        Medication reviewed in e-chart  Patient is monitored for medication toxicity  labs reviewed and interpreted independently, X rays independently reviewed  Notes from other physicians involved in care were reviewed    Charting was completed using voice recognition technology and may include unintended errors.    ADELINE ELAM MD, TERENCE.    Max Orourke Master Clinician in Hematology and Oncology  Medical Director, [facility].  [place] office  Phone [phone]  Fax      [phone]  [facility] /[place].  Phone [phone]  Fax     [phone]

## 2023-11-21 ENCOUNTER — LAB (OUTPATIENT)
Dept: LAB | Facility: LAB | Age: 58
End: 2023-11-21
Payer: COMMERCIAL

## 2023-11-21 DIAGNOSIS — E03.9 HYPOTHYROIDISM, UNSPECIFIED: Primary | ICD-10-CM

## 2023-11-21 DIAGNOSIS — I10 ESSENTIAL (PRIMARY) HYPERTENSION: ICD-10-CM

## 2023-11-21 DIAGNOSIS — E34.9 ENDOCRINE DISORDER, UNSPECIFIED: ICD-10-CM

## 2023-11-21 DIAGNOSIS — E78.5 HYPERLIPIDEMIA, UNSPECIFIED: ICD-10-CM

## 2023-11-21 DIAGNOSIS — E27.49 OTHER ADRENOCORTICAL INSUFFICIENCY (MULTI): ICD-10-CM

## 2023-11-21 DIAGNOSIS — I25.10 ATHEROSCLEROTIC HEART DISEASE OF NATIVE CORONARY ARTERY WITHOUT ANGINA PECTORIS: ICD-10-CM

## 2023-11-21 LAB
ALBUMIN SERPL BCP-MCNC: 4.6 G/DL (ref 3.4–5)
ALP SERPL-CCNC: 45 U/L (ref 33–110)
ALT SERPL W P-5'-P-CCNC: 20 U/L (ref 7–45)
ANION GAP SERPL CALC-SCNC: 13 MMOL/L (ref 10–20)
AST SERPL W P-5'-P-CCNC: 17 U/L (ref 9–39)
BASOPHILS # BLD AUTO: 0.04 X10*3/UL (ref 0–0.1)
BASOPHILS NFR BLD AUTO: 0.8 %
BILIRUB SERPL-MCNC: 0.7 MG/DL (ref 0–1.2)
BUN SERPL-MCNC: 14 MG/DL (ref 6–23)
CALCIUM SERPL-MCNC: 10 MG/DL (ref 8.6–10.6)
CHLORIDE SERPL-SCNC: 106 MMOL/L (ref 98–107)
CHOLEST SERPL-MCNC: 224 MG/DL (ref 0–199)
CHOLESTEROL/HDL RATIO: 2.4
CK SERPL-CCNC: 94 U/L (ref 0–215)
CO2 SERPL-SCNC: 30 MMOL/L (ref 21–32)
CORTIS AM PEAK SERPL-MSCNC: 19.3 UG/DL (ref 5–20)
CREAT SERPL-MCNC: 0.73 MG/DL (ref 0.5–1.05)
CRP SERPL HS-MCNC: 0.9 MG/L
EOSINOPHIL # BLD AUTO: 0.11 X10*3/UL (ref 0–0.7)
EOSINOPHIL NFR BLD AUTO: 2.3 %
ERYTHROCYTE [DISTWIDTH] IN BLOOD BY AUTOMATED COUNT: 12 % (ref 11.5–14.5)
GFR SERPL CREATININE-BSD FRML MDRD: >90 ML/MIN/1.73M*2
GLUCOSE SERPL-MCNC: 91 MG/DL (ref 74–99)
HCT VFR BLD AUTO: 44.1 % (ref 36–46)
HDLC SERPL-MCNC: 95.1 MG/DL
HGB BLD-MCNC: 14.3 G/DL (ref 12–16)
IMM GRANULOCYTES # BLD AUTO: 0.01 X10*3/UL (ref 0–0.7)
IMM GRANULOCYTES NFR BLD AUTO: 0.2 % (ref 0–0.9)
LDLC SERPL CALC-MCNC: 114 MG/DL
LYMPHOCYTES # BLD AUTO: 2.27 X10*3/UL (ref 1.2–4.8)
LYMPHOCYTES NFR BLD AUTO: 48.2 %
MCH RBC QN AUTO: 32.1 PG (ref 26–34)
MCHC RBC AUTO-ENTMCNC: 32.4 G/DL (ref 32–36)
MCV RBC AUTO: 99 FL (ref 80–100)
MONOCYTES # BLD AUTO: 0.44 X10*3/UL (ref 0.1–1)
MONOCYTES NFR BLD AUTO: 9.3 %
NEUTROPHILS # BLD AUTO: 1.84 X10*3/UL (ref 1.2–7.7)
NEUTROPHILS NFR BLD AUTO: 39.2 %
NON HDL CHOLESTEROL: 129 MG/DL (ref 0–149)
NRBC BLD-RTO: 0 /100 WBCS (ref 0–0)
PLATELET # BLD AUTO: 220 X10*3/UL (ref 150–450)
POTASSIUM SERPL-SCNC: 4.3 MMOL/L (ref 3.5–5.3)
PROT SERPL-MCNC: 6.7 G/DL (ref 6.4–8.2)
RBC # BLD AUTO: 4.46 X10*6/UL (ref 4–5.2)
SODIUM SERPL-SCNC: 145 MMOL/L (ref 136–145)
T3FREE SERPL-MCNC: 2.8 PG/ML (ref 2.3–4.2)
T4 FREE SERPL-MCNC: 0.97 NG/DL (ref 0.78–1.48)
TRIGL SERPL-MCNC: 73 MG/DL (ref 0–149)
TSH SERPL-ACNC: 1.18 MIU/L (ref 0.44–3.98)
VLDL: 15 MG/DL (ref 0–40)
WBC # BLD AUTO: 4.7 X10*3/UL (ref 4.4–11.3)

## 2023-11-21 PROCEDURE — 82550 ASSAY OF CK (CPK): CPT

## 2023-11-21 PROCEDURE — 84443 ASSAY THYROID STIM HORMONE: CPT

## 2023-11-21 PROCEDURE — 82533 TOTAL CORTISOL: CPT

## 2023-11-21 PROCEDURE — 84481 FREE ASSAY (FT-3): CPT

## 2023-11-21 PROCEDURE — 85025 COMPLETE CBC W/AUTO DIFF WBC: CPT

## 2023-11-21 PROCEDURE — 80061 LIPID PANEL: CPT

## 2023-11-21 PROCEDURE — 80053 COMPREHEN METABOLIC PANEL: CPT

## 2023-11-21 PROCEDURE — 84439 ASSAY OF FREE THYROXINE: CPT

## 2023-11-21 PROCEDURE — 36415 COLL VENOUS BLD VENIPUNCTURE: CPT

## 2023-11-21 PROCEDURE — 86141 C-REACTIVE PROTEIN HS: CPT

## 2023-11-22 ENCOUNTER — HOSPITAL ENCOUNTER (OUTPATIENT)
Dept: CARDIOLOGY | Facility: CLINIC | Age: 58
Discharge: HOME | End: 2023-11-22
Payer: COMMERCIAL

## 2023-11-22 PROCEDURE — 93005 ELECTROCARDIOGRAM TRACING: CPT

## 2023-11-22 PROCEDURE — 93010 ELECTROCARDIOGRAM REPORT: CPT | Performed by: INTERNAL MEDICINE

## 2023-12-21 ENCOUNTER — ANCILLARY PROCEDURE (OUTPATIENT)
Dept: RADIOLOGY | Facility: CLINIC | Age: 58
End: 2023-12-21
Payer: COMMERCIAL

## 2023-12-21 DIAGNOSIS — I25.10 ATHEROSCLEROTIC HEART DISEASE OF NATIVE CORONARY ARTERY WITHOUT ANGINA PECTORIS: ICD-10-CM

## 2023-12-21 DIAGNOSIS — I10 ESSENTIAL (PRIMARY) HYPERTENSION: ICD-10-CM

## 2023-12-21 PROCEDURE — 75571 CT HRT W/O DYE W/CA TEST: CPT

## 2024-01-15 ENCOUNTER — LAB (OUTPATIENT)
Dept: LAB | Facility: LAB | Age: 59
End: 2024-01-15
Payer: COMMERCIAL

## 2024-01-15 DIAGNOSIS — E78.5 HYPERLIPIDEMIA, UNSPECIFIED HYPERLIPIDEMIA TYPE: ICD-10-CM

## 2024-01-15 LAB
ALBUMIN SERPL BCP-MCNC: 4.4 G/DL (ref 3.4–5)
ALP SERPL-CCNC: 42 U/L (ref 33–110)
ALT SERPL W P-5'-P-CCNC: 16 U/L (ref 7–45)
ANION GAP SERPL CALC-SCNC: 10 MMOL/L (ref 10–20)
AST SERPL W P-5'-P-CCNC: 16 U/L (ref 9–39)
BILIRUB SERPL-MCNC: 0.8 MG/DL (ref 0–1.2)
BUN SERPL-MCNC: 12 MG/DL (ref 6–23)
CALCIUM SERPL-MCNC: 10.1 MG/DL (ref 8.6–10.6)
CHLORIDE SERPL-SCNC: 105 MMOL/L (ref 98–107)
CHOLEST SERPL-MCNC: 228 MG/DL (ref 0–199)
CHOLESTEROL/HDL RATIO: 2.3
CO2 SERPL-SCNC: 31 MMOL/L (ref 21–32)
CREAT SERPL-MCNC: 0.78 MG/DL (ref 0.5–1.05)
EGFRCR SERPLBLD CKD-EPI 2021: 88 ML/MIN/1.73M*2
GLUCOSE SERPL-MCNC: 125 MG/DL (ref 74–99)
HDLC SERPL-MCNC: 101.2 MG/DL
LDLC SERPL CALC-MCNC: 115 MG/DL
NON HDL CHOLESTEROL: 127 MG/DL (ref 0–149)
POTASSIUM SERPL-SCNC: 4.4 MMOL/L (ref 3.5–5.3)
PROT SERPL-MCNC: 6.6 G/DL (ref 6.4–8.2)
SODIUM SERPL-SCNC: 142 MMOL/L (ref 136–145)
TRIGL SERPL-MCNC: 58 MG/DL (ref 0–149)
VLDL: 12 MG/DL (ref 0–40)

## 2024-01-15 PROCEDURE — 80061 LIPID PANEL: CPT

## 2024-01-15 PROCEDURE — 80053 COMPREHEN METABOLIC PANEL: CPT

## 2024-01-15 PROCEDURE — 36415 COLL VENOUS BLD VENIPUNCTURE: CPT

## 2024-01-19 ENCOUNTER — OFFICE VISIT (OUTPATIENT)
Dept: PRIMARY CARE | Facility: CLINIC | Age: 59
End: 2024-01-19
Payer: COMMERCIAL

## 2024-01-19 VITALS
HEIGHT: 69 IN | HEART RATE: 89 BPM | TEMPERATURE: 98.1 F | WEIGHT: 145 LBS | BODY MASS INDEX: 21.48 KG/M2 | DIASTOLIC BLOOD PRESSURE: 92 MMHG | SYSTOLIC BLOOD PRESSURE: 148 MMHG | OXYGEN SATURATION: 98 %

## 2024-01-19 DIAGNOSIS — R73.01 FASTING HYPERGLYCEMIA: ICD-10-CM

## 2024-01-19 DIAGNOSIS — I10 PRIMARY HYPERTENSION: Primary | ICD-10-CM

## 2024-01-19 DIAGNOSIS — Z82.0 FAMILY HISTORY OF ALZHEIMER'S DISEASE: ICD-10-CM

## 2024-01-19 DIAGNOSIS — E78.5 HYPERLIPIDEMIA, UNSPECIFIED HYPERLIPIDEMIA TYPE: ICD-10-CM

## 2024-01-19 DIAGNOSIS — N64.4 BREAST PAIN: ICD-10-CM

## 2024-01-19 DIAGNOSIS — J30.9 ALLERGIC RHINITIS, UNSPECIFIED SEASONALITY, UNSPECIFIED TRIGGER: ICD-10-CM

## 2024-01-19 PROCEDURE — 1036F TOBACCO NON-USER: CPT | Performed by: INTERNAL MEDICINE

## 2024-01-19 PROCEDURE — 3080F DIAST BP >= 90 MM HG: CPT | Performed by: INTERNAL MEDICINE

## 2024-01-19 PROCEDURE — 3077F SYST BP >= 140 MM HG: CPT | Performed by: INTERNAL MEDICINE

## 2024-01-19 PROCEDURE — 99214 OFFICE O/P EST MOD 30 MIN: CPT | Performed by: INTERNAL MEDICINE

## 2024-01-19 RX ORDER — LOSARTAN POTASSIUM 50 MG/1
75 TABLET ORAL DAILY
COMMUNITY
End: 2024-03-12 | Stop reason: ALTCHOICE

## 2024-01-19 RX ORDER — MAGNESIUM L-LACTATE 84 MG
84 TABLET, EXTENDED RELEASE ORAL DAILY
COMMUNITY
End: 2024-04-15 | Stop reason: WASHOUT

## 2024-01-19 RX ORDER — LOSARTAN POTASSIUM 100 MG/1
100 TABLET ORAL DAILY
Qty: 90 TABLET | Refills: 0 | Status: SHIPPED | OUTPATIENT
Start: 2024-01-19 | End: 2024-04-15 | Stop reason: ALTCHOICE

## 2024-01-19 RX ORDER — ATORVASTATIN CALCIUM 10 MG/1
10 TABLET, FILM COATED ORAL DAILY
Qty: 90 TABLET | Refills: 1 | Status: SHIPPED | OUTPATIENT
Start: 2024-01-19

## 2024-01-19 RX ORDER — MONTELUKAST SODIUM 10 MG/1
10 TABLET ORAL DAILY
Qty: 90 TABLET | Refills: 1 | Status: SHIPPED | OUTPATIENT
Start: 2024-01-19

## 2024-01-19 ASSESSMENT — ENCOUNTER SYMPTOMS
HEADACHES: 0
SWEATS: 0
OCCASIONAL FEELINGS OF UNSTEADINESS: 0
HYPERTENSION: 1
DEPRESSION: 0
PALPITATIONS: 0
BLURRED VISION: 0
SHORTNESS OF BREATH: 0
PND: 0
LOSS OF SENSATION IN FEET: 0
NECK PAIN: 0
ORTHOPNEA: 0

## 2024-01-19 NOTE — PROGRESS NOTES
Patient presents today for follow-up Subjective   Patient ID: Alessandra Beltran is a 58 y.o. female who presents for Follow-up (EP.  Follow up.  Labs done.  B/P has been high.  Had testing done through another provider along with bp med added until she could get in here./L nipple is irritated./Would like tested for alzheimers.).    HPI  Hyperlipidemia hypertension.  Hypertension is new in onset.  She started to have some issues back in the fall.  She was at Dr. Muñoz's office.  It was noted to be high.  Continue to be high at home on her Omron cuff.  She then saw Dr. Underwood because she could not get an appointment here.  He started on losartan do blood work which we reviewed here in the office today.  Her blood pressure still running high but is trending down.  She is up to 75 mg of losartan.  Sees  dr Lee for HC following iron  no phlebotomy   Bp was high 150/93  Told needs to follow bp and  watch and fu  Then parents were in severe  car accident    Was having  elevated bp and was gone away and  called  couldn't go to ER   Patient states she is feeling better now no chest pains no palpitations no headaches dizziness lightheadedness or lower extremity edema.  She does feel like she retains some fluid at times.  She plans on having a vacation here soon where she will spend a month in [place] get plenty of exercise she is hoping that will help  Patient was today for genetic test for Alzheimer's  Patient also complains of left nipple hardness discomfort and burning.  She had a mammogram done that was normal.  This is a new symptom.  She has not been able to feel anything.  She will be in [place] back in March she will get her testing done then  Review of Systems    GENERAL - Denies fever, fatigue or chills  SKIN - Denies rash, new skin lesions, or change in moles  EYES - Denies blurred vision, or change in visual acuity  EARS - Denies ear pain, discharge, ringing, or difficulty hearing  NOSE - Denies nasal  "congestion, discharge, or bleeding  MOUTH - Denies sore throat, postnasal drip or painful/difficulty swallowing  NECK - Denies pain or swelling  RESPIRATORY - Denies shortness of breath, cough, wheezing  CARDIOVASCULAR - Denies palpitations, chest pain, orthopnea, peripheral edema, syncope or claudication  GASTROINTESTINAL - Denies nausea, vomiting, diarrhea, constipation, abdominal pain, melena and or bright red blood  GENITOURINARY - Denies dysuria, frequency of urination, urgency, or hesitancy  MUSCULOSKELETAL - Denies joint or muscle pain, or back pain  NEUROLOGICAL - Denies localized numbness, weakness, or tingling  PSYCHIATRIC - Denies depression, anxiety, substance abuse, suicidal or homicidal ideation  ENDOCRINE - Denies heat or cold intolerance, weight loss or gain, increasing thirst  HEMATO-IMMUNOLOGIC - Denies easy bruising, bleeding, oral ulcerations or recurrent infections     Objective   BP (!) 148/92   Pulse 89   Temp 36.7 °C (98.1 °F) (Oral)   Ht 1.74 m (5' 8.5\")   Wt 65.8 kg (145 lb)   SpO2 98%   BMI 21.73 kg/m²    Physical Exam    CONSTITUTIONAL - well nourished, well developed, looks like stated age, in no acute distress, not ill-appearing, and not tired appearing  SKIN - normal skin color and pigmentation, normal skin turgor without rash, lesions, or nodules visualized  HEAD - no trauma, normocephalic  EYES  - nl  ENT - TM's intact, no injection, no exudate,  nasal passage without discharge and patent  NECK - supple without rigidity, no neck mass was observed, no thyromegaly or thyroid nodules  CHEST - clear to auscultation, no wheezing, no crackles and no rales, good effort  CARDIAC - regular rate and regular rhythm, no skipped beats, no murmur  ABDOMEN - no organomegaly, soft, nontender, nondistended, normal bowel sounds, no guarding/rebound/rigidity  EXTREMITIES - no edema, no deformities  NEUROLOGICAL -  nl  PSYCHIATRIC - alert, pleasant and cordial, age-appropriate  LYMPHATIC- no " cervical lymphadenopathy    Assessment/Plan   Diagnoses and all orders for this visit:  Primary hypertension  -     losartan (Cozaar) 100 mg tablet; Take 1 tablet (100 mg) by mouth once daily.  -     Lipid Panel; Future  -     Comprehensive Metabolic Panel; Future  Hyperlipidemia, unspecified hyperlipidemia type  -     atorvastatin (Lipitor) 10 mg tablet; Take 1 tablet (10 mg) by mouth once daily.  -     Lipid Panel; Future  -     Comprehensive Metabolic Panel; Future  Allergic rhinitis, unspecified seasonality, unspecified trigger  -     montelukast (Singulair) 10 mg tablet; Take 1 tablet (10 mg) by mouth once daily.  Family history of Alzheimer's disease  -     Referral to Genetics; Future  Breast pain  -     BI US breast complete left; Future  -     BI mammo left diagnostic tomosynthesis; Future  -     Referral to Breast Surgery; Future  Fasting hyperglycemia  -     Hemoglobin A1C; Future    Orders as above  Referred to genetics  She will follow-up with me in March after she returns  Increase losartan to 100 mg.  If blood pressure continues to be high we will add hydrochlorothiazide 12 and half 25 mg pending on the blood pressure  Charity Mccallum MD

## 2024-01-24 ENCOUNTER — HOSPITAL ENCOUNTER (OUTPATIENT)
Dept: RADIOLOGY | Facility: CLINIC | Age: 59
Discharge: HOME | End: 2024-01-24
Payer: COMMERCIAL

## 2024-01-24 DIAGNOSIS — N64.4 BREAST PAIN: ICD-10-CM

## 2024-01-24 PROCEDURE — 76642 ULTRASOUND BREAST LIMITED: CPT | Mod: LEFT SIDE | Performed by: RADIOLOGY

## 2024-01-24 PROCEDURE — 76642 ULTRASOUND BREAST LIMITED: CPT | Mod: LT

## 2024-01-24 PROCEDURE — 77061 BREAST TOMOSYNTHESIS UNI: CPT | Mod: LEFT SIDE | Performed by: RADIOLOGY

## 2024-01-24 PROCEDURE — 77061 BREAST TOMOSYNTHESIS UNI: CPT | Mod: LT

## 2024-01-24 PROCEDURE — 77065 DX MAMMO INCL CAD UNI: CPT | Mod: LEFT SIDE | Performed by: RADIOLOGY

## 2024-03-04 ENCOUNTER — TELEMEDICINE CLINICAL SUPPORT (OUTPATIENT)
Dept: GENETICS | Facility: CLINIC | Age: 59
End: 2024-03-04
Payer: COMMERCIAL

## 2024-03-04 DIAGNOSIS — Z82.0 FAMILY HISTORY OF ALZHEIMER'S DISEASE: ICD-10-CM

## 2024-03-04 PROCEDURE — 96040 PR MEDICAL GENETICS COUNSELING EACH 30 MINUTES: CPT | Performed by: GENETIC COUNSELOR, MS

## 2024-03-04 NOTE — PROGRESS NOTES
"Alessandra Beltran is a 58 year old female who was referred from her PCP (Dr. Charity Mccallum) to discuss genetic testing for Alzheimer's disease given her family history.     HPI:  Alessandra has a history of recurrent melanoma with the first diagnosis occurring in her late 30's.  She's had multiple moles removed.  She is following with a \"melanoma specialist\" at [facility].  She goes every 4 months for a full body check.  She's has not had any concerning moles removed for over 1.5 years.  She reportedly had genetic testing for genes related to melanoma about 2 years ago through [facility] (a copy of this report is not available for review).      Testing for hereditary hemochromatosis was ordered by Dr. Srinath Chamorro ([facility]) in 2023 due to mildly persistent elevated iron and ferritin levels.  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  She reports her levels are currently being monitored regularly.  She is seeing Dr. Chamorro every 6 months and her next appointment is in May 2024.    Alessandra reports she's had a difficult past few months.  Her father passed away 2 weeks ago.  When asked if she has noticed any signs concerning for Alzheimer's/dementia, she reports that every one in awhile she has trouble finding words and will misplace items.  She sometimes forgets when people tell her things.  She has not noticed any symptoms that disrupt her day to day living.      SOCIAL HISTORY:  Alessandra lives with her .      FAMILY HISTORY:    A 4-generation pedigree was obtained and was significant for the following:  - Patient, 58 years old, history of melanoma (first diagnosed in her late 30's)  - Brother, 54 years old, melanoma diagnosed in his late 30's, hypertension, diabetes (recently diagnosed)  - Mother, 82 years old, melanoma diagnosed at 39, COPD (nonsmoker), diabetes  - Three maternal male 1st cousins (brothers) with infertility (two of them reportedly due to an " "\"issue with the vas deferens\")  - Maternal grandmother,  at 91, dementia diagnosed in her mid 80's  - Father,  at 84, Alzheimer's disease diagnosed at 82 (symptoms started in his mid 70's).  He reportedly was found to have an APOE mutation.  History of a heart attack and glaucoma  - Paternal aunt,  at 77, Lewy body dementia diagnosed in her early 70's, heart attack in her 40's  - Paternal grandmother,  at 76, \"cognitive decline\" in her late 60's/early 70's, history of TIAs  - Paternal grandfather,  at 71 from liver cancer diagnosed at 71, history of glaucoma    Maternal ancestry is English.  Paternal ancestry is Syrian/Argentine.  There is no known Ashkenazi Shinto ancestry.  Consanguinity was declined.      Assessment/Plan   Alessandra Beltran is a 58 y.o. female who was referred for her family history of Alzheimer's disease.  Our discussion is summarized below.    Genetic evaluation is not a tool to diagnose dementia.  Dementia is a term that describes a group of symptoms associated with cognitive and/or behavioral changes that interfere with the ability to perform everyday activities.   The core mental functions that may be affected in individuals with dementia include memory, communication, personality, behavior, reasoning, judgement, and visual perception.     Alzheimer's disease (AD) and frontotemporal dementia (FTD) are the two most common types of dementia. Most cases of dementia, including late-onset Alzheimer's disease (diagnosed after 65 years of age), have MULTIFACTORIAL INHERITANCE without testable genes.  We reviewed multifactorial inheritance.     We reviewed \"familial early-onset AD\" which is caused by mutations in TANISHA, PSEN1, or PSEN2 in 60%-80% of the cases. Individuals with familial early-onset AD have dementia before 60-65 years of age. The lifetime risk of dementia when having mutations in these genes is as high as 100%. Testing these three genes is not " "clinically indicated in Alessandra since her family history does not suggest early-onset dementia given her family members who were symptomatic all developed symptoms AFTER age 65.   Therefore, testing for the TANISHA, PSEN1, or PSEN2 genes is not recommended.     We reviewed \"familial late-onset AD,\" among which 60% of the patients have a genetic variant in the APOE gene called APOE4.   Having one e4 allele would increase the lifetime risk of AD from 10%-15% (baseline) to 15%-25%.   APOE4 homozygotes (rare) have a lifetime risk of developing AD of 40%-45% by age 75 years in female and 25%-30% by age 80 in male.     We reviewed the limitations of the APOE testing:   When there is a VUS in a gene known to predispose to a neurodegenerative condition without preventative or therapeutic measures, it can be harmful psychologically.  APOE testing has LIMITED clinical utility and predictive value.   Even if positive, there is no change in clinical management and the person may not develop clinical dementia.   There is no prevention.   Having a positive result may impact insurability.   Her risk based on having a family member with dementia/Alzheimer's is not significantly different than what her risk would be if she has an e4 allele.  Insurance will not pay for the test given that it is not recommended.    Testing of this gene is NOT recommended by professional organizations, such as the American College of Medical Genetics (ACMG).  According to [facility]'s [facility], if an individual's clinical and family history are not suggestive of a single gene cause of dementia, no further genetics workup is recommended.     We also reviewed information regarding her personal history of melanoma and the family history of melanoma in Alessandra's brother and mother.  Melanoma is thought to have a hereditary component in 5-10% of cases; evaluating personal and family histories is a major part of hereditary cancer risk assessment. " Mutations in multiple genes cause hereditary melanoma, which markedly increase the lifetime risk for melanoma and other cancers.  Alessandra reports her provider at [facility] ordered genetic testing of genes related to melanoma given her history.  I requested that she send me a copy of her test report to review to determine if there would be any additional genetic testing I would recommend.  My contact information was provided to her today and she will send me her test report to review.      PLAN:  In summary, there is no indicated genetic testing for Alessandra and no genetic testing was ordered at her appointment today.  According to the CDC, followings are recommended to lower the risk of AD: prevent and manage high blood pressure; manage blood sugar; maintain a healthy weight; be physically active; quit smoking; avoid excessive drinking; prevent and correct hearing loss; get enough sleep.   We reviewed that if there are any changes to her family history, she should contact our office.   Alessandra is going to email me a copy of her genetic testing related to hereditary cancer genes that was done at [facility] to review  Alessandra is encouraged to contact our office at [phone] if she has any further questions or concerns.       Annmarie Yancey MS  Licensed Genetic Counselor  [facility]  [phone]    Reviewed by:  Marion Warren MD, PhD  Clinical    Genetics and Genome Sciences and Pediatrics  [facility].

## 2024-03-07 ENCOUNTER — LAB (OUTPATIENT)
Dept: LAB | Facility: LAB | Age: 59
End: 2024-03-07
Payer: COMMERCIAL

## 2024-03-07 DIAGNOSIS — R73.01 FASTING HYPERGLYCEMIA: ICD-10-CM

## 2024-03-07 DIAGNOSIS — E78.5 HYPERLIPIDEMIA, UNSPECIFIED HYPERLIPIDEMIA TYPE: ICD-10-CM

## 2024-03-07 DIAGNOSIS — I10 PRIMARY HYPERTENSION: ICD-10-CM

## 2024-03-07 LAB
ALBUMIN SERPL BCP-MCNC: 4.3 G/DL (ref 3.4–5)
ALP SERPL-CCNC: 57 U/L (ref 33–110)
ALT SERPL W P-5'-P-CCNC: 18 U/L (ref 7–45)
ANION GAP SERPL CALC-SCNC: 13 MMOL/L (ref 10–20)
AST SERPL W P-5'-P-CCNC: 16 U/L (ref 9–39)
BILIRUB SERPL-MCNC: 0.8 MG/DL (ref 0–1.2)
BUN SERPL-MCNC: 15 MG/DL (ref 6–23)
CALCIUM SERPL-MCNC: 9.7 MG/DL (ref 8.6–10.6)
CHLORIDE SERPL-SCNC: 106 MMOL/L (ref 98–107)
CHOLEST SERPL-MCNC: 207 MG/DL (ref 0–199)
CHOLESTEROL/HDL RATIO: 2.2
CO2 SERPL-SCNC: 27 MMOL/L (ref 21–32)
CREAT SERPL-MCNC: 0.75 MG/DL (ref 0.5–1.05)
EGFRCR SERPLBLD CKD-EPI 2021: >90 ML/MIN/1.73M*2
EST. AVERAGE GLUCOSE BLD GHB EST-MCNC: 114 MG/DL
GLUCOSE SERPL-MCNC: 101 MG/DL (ref 74–99)
HBA1C MFR BLD: 5.6 %
HDLC SERPL-MCNC: 96 MG/DL
LDLC SERPL CALC-MCNC: 98 MG/DL
NON HDL CHOLESTEROL: 111 MG/DL (ref 0–149)
POTASSIUM SERPL-SCNC: 4.3 MMOL/L (ref 3.5–5.3)
PROT SERPL-MCNC: 6.6 G/DL (ref 6.4–8.2)
SODIUM SERPL-SCNC: 142 MMOL/L (ref 136–145)
TRIGL SERPL-MCNC: 64 MG/DL (ref 0–149)
VLDL: 13 MG/DL (ref 0–40)

## 2024-03-07 PROCEDURE — 36415 COLL VENOUS BLD VENIPUNCTURE: CPT

## 2024-03-07 PROCEDURE — 80061 LIPID PANEL: CPT

## 2024-03-07 PROCEDURE — 83036 HEMOGLOBIN GLYCOSYLATED A1C: CPT

## 2024-03-07 PROCEDURE — 80053 COMPREHEN METABOLIC PANEL: CPT

## 2024-03-08 ENCOUNTER — TELEPHONE (OUTPATIENT)
Dept: PRIMARY CARE | Facility: CLINIC | Age: 59
End: 2024-03-08
Payer: COMMERCIAL

## 2024-03-08 NOTE — TELEPHONE ENCOUNTER
Pt hs appt w me in March and I see her next appt in June is w Dr Leal  Can you please fix this she is my pt

## 2024-03-12 ENCOUNTER — OFFICE VISIT (OUTPATIENT)
Dept: PRIMARY CARE | Facility: CLINIC | Age: 59
End: 2024-03-12
Payer: COMMERCIAL

## 2024-03-12 VITALS
OXYGEN SATURATION: 97 % | WEIGHT: 143 LBS | SYSTOLIC BLOOD PRESSURE: 142 MMHG | TEMPERATURE: 98.2 F | DIASTOLIC BLOOD PRESSURE: 90 MMHG | HEIGHT: 69 IN | BODY MASS INDEX: 21.18 KG/M2 | HEART RATE: 82 BPM

## 2024-03-12 DIAGNOSIS — J45.20 MILD INTERMITTENT REACTIVE AIRWAY DISEASE WITHOUT COMPLICATION (HHS-HCC): ICD-10-CM

## 2024-03-12 DIAGNOSIS — J01.10 ACUTE NON-RECURRENT FRONTAL SINUSITIS: ICD-10-CM

## 2024-03-12 DIAGNOSIS — I10 PRIMARY HYPERTENSION: ICD-10-CM

## 2024-03-12 DIAGNOSIS — E78.2 MIXED HYPERLIPIDEMIA: Primary | ICD-10-CM

## 2024-03-12 PROCEDURE — 1036F TOBACCO NON-USER: CPT | Performed by: INTERNAL MEDICINE

## 2024-03-12 PROCEDURE — 3077F SYST BP >= 140 MM HG: CPT | Performed by: INTERNAL MEDICINE

## 2024-03-12 PROCEDURE — 3080F DIAST BP >= 90 MM HG: CPT | Performed by: INTERNAL MEDICINE

## 2024-03-12 PROCEDURE — 99214 OFFICE O/P EST MOD 30 MIN: CPT | Performed by: INTERNAL MEDICINE

## 2024-03-12 RX ORDER — LOSARTAN POTASSIUM AND HYDROCHLOROTHIAZIDE 12.5; 1 MG/1; MG/1
1 TABLET ORAL DAILY
Qty: 90 TABLET | Refills: 0 | Status: SHIPPED | OUTPATIENT
Start: 2024-03-12 | End: 2024-04-16 | Stop reason: SINTOL

## 2024-03-12 RX ORDER — LEVOFLOXACIN 500 MG/1
500 TABLET, FILM COATED ORAL DAILY
Qty: 10 TABLET | Refills: 0 | Status: SHIPPED | OUTPATIENT
Start: 2024-03-12 | End: 2024-03-22

## 2024-03-12 RX ORDER — ALBUTEROL SULFATE 90 UG/1
2 AEROSOL, METERED RESPIRATORY (INHALATION) EVERY 6 HOURS PRN
Qty: 18 G | Refills: 0 | Status: SHIPPED | OUTPATIENT
Start: 2024-03-12 | End: 2024-04-11

## 2024-03-12 ASSESSMENT — PATIENT HEALTH QUESTIONNAIRE - PHQ9
SUM OF ALL RESPONSES TO PHQ9 QUESTIONS 1 AND 2: 0
2. FEELING DOWN, DEPRESSED OR HOPELESS: NOT AT ALL
1. LITTLE INTEREST OR PLEASURE IN DOING THINGS: NOT AT ALL

## 2024-03-12 ASSESSMENT — ENCOUNTER SYMPTOMS
OCCASIONAL FEELINGS OF UNSTEADINESS: 0
DEPRESSION: 0
LOSS OF SENSATION IN FEET: 0

## 2024-03-12 NOTE — PROGRESS NOTES
"Subjective   Patient ID: Alessandra Beltran is a 58 y.o. female who presents for Follow-up (EP.  Follow up.  Labs done.  Coughing for a few days and congestion and heavy feeling in chest lashay eyes burning and head pounding and yellow mucus.).  HPI  Here for routine follow-up and also sick  Started coughing 2 nights ago   Has been having fits and can't catch breath comes in fits    Felt tight and airways spasms   Possible low fever and had chills   Very runny nose blowing nose    Has yellow mucus   Hx inhaler  use in past when was ill    Recently lost her Dad and was down in [facility]    She is following up on cholesterol medications as well as hypertension medications her blood pressure continues to be elevated although she is ill she brought in a log and has been high for quite some time similar to the visit blood pressure today at 142/90 occasionally as high as 113 diastolic she had 1 low blood pressure  She has no chest pains headaches dizziness or lightheadedness  Shortness of breath she has had is related to the cold currently  She denies lower extremity edema  She has been retired about 4 months and trying to institute an exercise program that she can do routinely    Review of Systems  Review of systems was performed and is otherwise negative except as noted in HPI.  Objective   /90   Pulse 82   Temp 36.8 °C (98.2 °F) (Oral)   Ht 1.74 m (5' 8.5\")   Wt 64.9 kg (143 lb)   SpO2 97%   BMI 21.43 kg/m²    Physical Exam  HEENT fluid posterior bilateral TMs nares are red no active discharge oropharynx slight red neck is supple no lymphadenopathy  Lungs clear bilaterally no active wheeze rales or rhonchi harsh cough  Heart is regular rate rhythm no murmurs  Abdomen benign  Lower extremities no edema    Assessment/Plan   Diagnoses and all orders for this visit:  Mixed hyperlipidemia  Primary hypertension  -     losartan-hydrochlorothiazide (Hyzaar) 100-12.5 mg tablet; Take 1 tablet by mouth once daily.  -     " Basic metabolic panel; Future  Acute non-recurrent frontal sinusitis  -     levoFLOXacin (Levaquin) 500 mg tablet; Take 1 tablet (500 mg) by mouth once daily for 10 days.  Mild intermittent reactive airway disease without complication  -     albuterol 90 mcg/actuation inhaler; Inhale 2 puffs every 6 hours if needed for wheezing.    Will treat sinus infection with Levaquin  Due to patient allergies last time she thinks she was treated with Cipro  Due to the heaviness of the cough we will choose Levaquin  Discussed side effects of the medication she will avoid heavy exercise  Suboptimal blood pressure control will change losartan to losartan HCTZ  Follow-up with me in 4 weeks with BMP before  I have added on inhaler for cough and she will do Mucinex regular NYD  Call with issues    Charity Mccallum MD

## 2024-03-17 ENCOUNTER — PATIENT MESSAGE (OUTPATIENT)
Dept: PRIMARY CARE | Facility: CLINIC | Age: 59
End: 2024-03-17
Payer: COMMERCIAL

## 2024-03-17 DIAGNOSIS — R50.9 FEVER, UNSPECIFIED FEVER CAUSE: Primary | ICD-10-CM

## 2024-03-17 DIAGNOSIS — R05.1 ACUTE COUGH: ICD-10-CM

## 2024-03-18 ENCOUNTER — APPOINTMENT (OUTPATIENT)
Dept: GENETICS | Facility: CLINIC | Age: 59
End: 2024-03-18
Payer: COMMERCIAL

## 2024-03-19 ENCOUNTER — HOSPITAL ENCOUNTER (OUTPATIENT)
Dept: RADIOLOGY | Facility: CLINIC | Age: 59
Discharge: HOME | End: 2024-03-19
Payer: COMMERCIAL

## 2024-03-19 ENCOUNTER — LAB (OUTPATIENT)
Dept: LAB | Facility: LAB | Age: 59
End: 2024-03-19
Payer: COMMERCIAL

## 2024-03-19 DIAGNOSIS — R05.1 ACUTE COUGH: ICD-10-CM

## 2024-03-19 DIAGNOSIS — R50.9 FEVER, UNSPECIFIED FEVER CAUSE: ICD-10-CM

## 2024-03-19 DIAGNOSIS — I10 PRIMARY HYPERTENSION: ICD-10-CM

## 2024-03-19 PROCEDURE — 86663 EPSTEIN-BARR ANTIBODY: CPT

## 2024-03-19 PROCEDURE — 80053 COMPREHEN METABOLIC PANEL: CPT

## 2024-03-19 PROCEDURE — 86665 EPSTEIN-BARR CAPSID VCA: CPT

## 2024-03-19 PROCEDURE — 86664 EPSTEIN-BARR NUCLEAR ANTIGEN: CPT

## 2024-03-19 PROCEDURE — 36415 COLL VENOUS BLD VENIPUNCTURE: CPT

## 2024-03-19 PROCEDURE — 81003 URINALYSIS AUTO W/O SCOPE: CPT

## 2024-03-19 PROCEDURE — 71046 X-RAY EXAM CHEST 2 VIEWS: CPT

## 2024-03-19 PROCEDURE — 85025 COMPLETE CBC W/AUTO DIFF WBC: CPT

## 2024-03-20 ENCOUNTER — OFFICE VISIT (OUTPATIENT)
Dept: PRIMARY CARE | Facility: CLINIC | Age: 59
End: 2024-03-20
Payer: COMMERCIAL

## 2024-03-20 VITALS
SYSTOLIC BLOOD PRESSURE: 142 MMHG | WEIGHT: 140 LBS | DIASTOLIC BLOOD PRESSURE: 82 MMHG | HEIGHT: 68 IN | TEMPERATURE: 99.2 F | BODY MASS INDEX: 21.22 KG/M2 | OXYGEN SATURATION: 98 % | HEART RATE: 97 BPM

## 2024-03-20 DIAGNOSIS — R50.9 FEVER, UNSPECIFIED FEVER CAUSE: ICD-10-CM

## 2024-03-20 DIAGNOSIS — R05.1 ACUTE COUGH: ICD-10-CM

## 2024-03-20 DIAGNOSIS — R55 SYNCOPE, UNSPECIFIED SYNCOPE TYPE: Primary | ICD-10-CM

## 2024-03-20 LAB
ALBUMIN SERPL BCP-MCNC: 4.8 G/DL (ref 3.4–5)
ALP SERPL-CCNC: 60 U/L (ref 33–110)
ALT SERPL W P-5'-P-CCNC: 14 U/L (ref 7–45)
ANION GAP SERPL CALC-SCNC: 13 MMOL/L (ref 10–20)
APPEARANCE UR: CLEAR
AST SERPL W P-5'-P-CCNC: 14 U/L (ref 9–39)
BASOPHILS # BLD AUTO: 0.04 X10*3/UL (ref 0–0.1)
BASOPHILS NFR BLD AUTO: 0.8 %
BILIRUB SERPL-MCNC: 0.7 MG/DL (ref 0–1.2)
BILIRUB UR STRIP.AUTO-MCNC: NEGATIVE MG/DL
BUN SERPL-MCNC: 18 MG/DL (ref 6–23)
CALCIUM SERPL-MCNC: 10 MG/DL (ref 8.6–10.6)
CHLORIDE SERPL-SCNC: 97 MMOL/L (ref 98–107)
CO2 SERPL-SCNC: 30 MMOL/L (ref 21–32)
COLOR UR: COLORLESS
CREAT SERPL-MCNC: 0.84 MG/DL (ref 0.5–1.05)
EBV EA IGG SER QL: NEGATIVE
EBV NA AB SER QL: POSITIVE
EBV VCA IGG SER IA-ACNC: POSITIVE
EBV VCA IGM SER IA-ACNC: NEGATIVE
EGFRCR SERPLBLD CKD-EPI 2021: 80 ML/MIN/1.73M*2
EOSINOPHIL # BLD AUTO: 0.12 X10*3/UL (ref 0–0.7)
EOSINOPHIL NFR BLD AUTO: 2.4 %
ERYTHROCYTE [DISTWIDTH] IN BLOOD BY AUTOMATED COUNT: 11.7 % (ref 11.5–14.5)
GLUCOSE SERPL-MCNC: 120 MG/DL (ref 74–99)
GLUCOSE UR STRIP.AUTO-MCNC: NORMAL MG/DL
HCT VFR BLD AUTO: 45.9 % (ref 36–46)
HGB BLD-MCNC: 15.4 G/DL (ref 12–16)
IMM GRANULOCYTES # BLD AUTO: 0.02 X10*3/UL (ref 0–0.7)
IMM GRANULOCYTES NFR BLD AUTO: 0.4 % (ref 0–0.9)
KETONES UR STRIP.AUTO-MCNC: NEGATIVE MG/DL
LEUKOCYTE ESTERASE UR QL STRIP.AUTO: NEGATIVE
LYMPHOCYTES # BLD AUTO: 2.21 X10*3/UL (ref 1.2–4.8)
LYMPHOCYTES NFR BLD AUTO: 44.3 %
MCH RBC QN AUTO: 32.4 PG (ref 26–34)
MCHC RBC AUTO-ENTMCNC: 33.6 G/DL (ref 32–36)
MCV RBC AUTO: 97 FL (ref 80–100)
MONOCYTES # BLD AUTO: 0.51 X10*3/UL (ref 0.1–1)
MONOCYTES NFR BLD AUTO: 10.2 %
NEUTROPHILS # BLD AUTO: 2.09 X10*3/UL (ref 1.2–7.7)
NEUTROPHILS NFR BLD AUTO: 41.9 %
NITRITE UR QL STRIP.AUTO: NEGATIVE
NRBC BLD-RTO: 0 /100 WBCS (ref 0–0)
PH UR STRIP.AUTO: 6 [PH]
PLATELET # BLD AUTO: 230 X10*3/UL (ref 150–450)
POTASSIUM SERPL-SCNC: 4 MMOL/L (ref 3.5–5.3)
PROT SERPL-MCNC: 7.2 G/DL (ref 6.4–8.2)
PROT UR STRIP.AUTO-MCNC: NEGATIVE MG/DL
RBC # BLD AUTO: 4.75 X10*6/UL (ref 4–5.2)
RBC # UR STRIP.AUTO: NEGATIVE /UL
SODIUM SERPL-SCNC: 136 MMOL/L (ref 136–145)
SP GR UR STRIP.AUTO: 1
UROBILINOGEN UR STRIP.AUTO-MCNC: NORMAL MG/DL
WBC # BLD AUTO: 5 X10*3/UL (ref 4.4–11.3)

## 2024-03-20 PROCEDURE — 99214 OFFICE O/P EST MOD 30 MIN: CPT | Performed by: INTERNAL MEDICINE

## 2024-03-20 PROCEDURE — 3079F DIAST BP 80-89 MM HG: CPT | Performed by: INTERNAL MEDICINE

## 2024-03-20 PROCEDURE — 3077F SYST BP >= 140 MM HG: CPT | Performed by: INTERNAL MEDICINE

## 2024-03-20 PROCEDURE — 1036F TOBACCO NON-USER: CPT | Performed by: INTERNAL MEDICINE

## 2024-03-20 ASSESSMENT — PATIENT HEALTH QUESTIONNAIRE - PHQ9
1. LITTLE INTEREST OR PLEASURE IN DOING THINGS: NOT AT ALL
2. FEELING DOWN, DEPRESSED OR HOPELESS: NOT AT ALL
SUM OF ALL RESPONSES TO PHQ9 QUESTIONS 1 AND 2: 0

## 2024-03-20 ASSESSMENT — ENCOUNTER SYMPTOMS
OCCASIONAL FEELINGS OF UNSTEADINESS: 0
DEPRESSION: 0
LOSS OF SENSATION IN FEET: 0

## 2024-03-20 NOTE — PROGRESS NOTES
"Subjective   Patient ID: Alessandra Beltran is a 58 y.o. female who presents for Cough (EP.  Cough, fever, yellow mucus, headache, slight dizziness going on 10 days and aches.).  HPI  Patient presents today for follow-up for febrile illness and cough.  She had called the office couple of days ago stating that she is not feeling any better and that 3 days prior she had a fainting spell at home after feeling vertigo.  The squad was called she was assessed she chose not to go to the emergency room.  She continues to feel feverish with temperatures maxing out 101.6.  Yesterday she did chest x-ray and blood work we reviewed them in the office today they are unremarkable so far her urinalysis showed good hydration otherwise was unremarkable.  She was supposed to have blood cultures and urine cultures done were awaiting those results.  Patient's chest x-ray is negative.  She continues on her Levaquin antibiotic.  She states today she is feeling slightly better.  She is very tired and laid up.  She has no colored mucus her cough is dry she has some minor sinus pressure that is feeling better her fatigue has been overwhelming she has been hydrating well but not had much of an appetite she has had some episodes of vertigo  She has Charles testing with pending  Review of Systems  Review of systems was performed and is otherwise negative except as noted in HPI.  Objective   /82   Pulse 97   Temp 37.3 °C (99.2 °F) (Oral)   Ht 1.727 m (5' 8\")   Wt 63.5 kg (140 lb)   SpO2 98%   BMI 21.29 kg/m²    Physical Exam  HEENT is normal  Lungs clear bilaterally  Heart is regular rate rhythm no murmurs  Abdomen benign  Lower extremities no edema    Assessment/Plan   Diagnoses and all orders for this visit:  Syncope, unspecified syncope type  -     Referral to Cardiology; Future  Acute cough  Fever, unspecified fever cause    Awaiting remainder of the blood work.  She will complete her antibiotic.  She will call with issues  She " will follow-up with cardiology because of the syncopal spell follow-up with me for routine sooner if needed  Patient will emergency room if anything worsens  Charity Mccallum MD

## 2024-04-05 PROBLEM — R73.9 HYPERGLYCEMIA: Status: ACTIVE | Noted: 2024-03-07

## 2024-04-05 PROBLEM — N64.4 BREAST PAIN: Status: ACTIVE | Noted: 2024-01-24

## 2024-04-05 PROBLEM — J45.20 MILD INTERMITTENT ASTHMA (HHS-HCC): Status: ACTIVE | Noted: 2024-04-05

## 2024-04-15 ENCOUNTER — OFFICE VISIT (OUTPATIENT)
Dept: PRIMARY CARE | Facility: CLINIC | Age: 59
End: 2024-04-15
Payer: COMMERCIAL

## 2024-04-15 VITALS
DIASTOLIC BLOOD PRESSURE: 94 MMHG | TEMPERATURE: 98 F | HEIGHT: 68 IN | BODY MASS INDEX: 21.82 KG/M2 | SYSTOLIC BLOOD PRESSURE: 132 MMHG | OXYGEN SATURATION: 99 % | HEART RATE: 88 BPM | WEIGHT: 144 LBS

## 2024-04-15 DIAGNOSIS — R19.7 DIARRHEA OF PRESUMED INFECTIOUS ORIGIN: Primary | ICD-10-CM

## 2024-04-15 DIAGNOSIS — I10 PRIMARY HYPERTENSION: ICD-10-CM

## 2024-04-15 DIAGNOSIS — E78.2 MIXED HYPERLIPIDEMIA: ICD-10-CM

## 2024-04-15 PROCEDURE — 99214 OFFICE O/P EST MOD 30 MIN: CPT | Performed by: INTERNAL MEDICINE

## 2024-04-15 PROCEDURE — 1036F TOBACCO NON-USER: CPT | Performed by: INTERNAL MEDICINE

## 2024-04-15 PROCEDURE — 3075F SYST BP GE 130 - 139MM HG: CPT | Performed by: INTERNAL MEDICINE

## 2024-04-15 PROCEDURE — 3080F DIAST BP >= 90 MM HG: CPT | Performed by: INTERNAL MEDICINE

## 2024-04-15 ASSESSMENT — ENCOUNTER SYMPTOMS
LOSS OF SENSATION IN FEET: 0
OCCASIONAL FEELINGS OF UNSTEADINESS: 0
DEPRESSION: 0

## 2024-04-15 ASSESSMENT — PATIENT HEALTH QUESTIONNAIRE - PHQ9
SUM OF ALL RESPONSES TO PHQ9 QUESTIONS 1 AND 2: 0
1. LITTLE INTEREST OR PLEASURE IN DOING THINGS: NOT AT ALL
2. FEELING DOWN, DEPRESSED OR HOPELESS: NOT AT ALL

## 2024-04-15 NOTE — PROGRESS NOTES
"Subjective   Patient ID: Alessandra Beltran is a 58 y.o. female who presents for Follow-up (EP.  Follow up.  Labs done.  Concerned with bp.  Appt with cardiology Dr. Contreras tmrw.).  HPI  Here for fu htn    Feeling well still having elevated blood pressure w losartan 100  mg/hydrochlorothiazide had increased diuresis  No dizziness   No cp headaches so or edema    Has appt w cardiology tomorrow re  syncope   Having recent episodes of diarrhea   Had some urgency  Review of Systems  Review of systems was performed and is otherwise negative except as noted in HPI.     Objective   BP (!) 132/94   Pulse 88   Temp 36.7 °C (98 °F) (Oral)   Ht 1.727 m (5' 8\")   Wt 65.3 kg (144 lb)   SpO2 99%   BMI 21.90 kg/m²    Physical Exam  HEENT is normal  Lungs clear bilaterally  Heart is regular rate rhythm no murmurs  Abdomen benign  Lower extremities no edema   Assessment/Plan   Diagnoses and all orders for this visit:  Diarrhea of presumed infectious origin  -     C. difficile, PCR; Future  Mixed hyperlipidemia  Primary hypertension    Call w issues   Reviewed bw    Fu  6 weeks    If diarrhea recurs c diff testing  Charity Mccallum MD   "

## 2024-04-16 ENCOUNTER — OFFICE VISIT (OUTPATIENT)
Dept: CARDIOLOGY | Facility: CLINIC | Age: 59
End: 2024-04-16
Payer: COMMERCIAL

## 2024-04-16 VITALS
WEIGHT: 144 LBS | OXYGEN SATURATION: 98 % | HEART RATE: 82 BPM | SYSTOLIC BLOOD PRESSURE: 130 MMHG | HEIGHT: 68 IN | BODY MASS INDEX: 21.82 KG/M2 | DIASTOLIC BLOOD PRESSURE: 74 MMHG

## 2024-04-16 DIAGNOSIS — E78.2 MIXED HYPERLIPIDEMIA: ICD-10-CM

## 2024-04-16 DIAGNOSIS — R55 SYNCOPE, UNSPECIFIED SYNCOPE TYPE: Primary | ICD-10-CM

## 2024-04-16 DIAGNOSIS — I10 PRIMARY HYPERTENSION: ICD-10-CM

## 2024-04-16 PROCEDURE — 3075F SYST BP GE 130 - 139MM HG: CPT | Performed by: STUDENT IN AN ORGANIZED HEALTH CARE EDUCATION/TRAINING PROGRAM

## 2024-04-16 PROCEDURE — 99204 OFFICE O/P NEW MOD 45 MIN: CPT | Performed by: STUDENT IN AN ORGANIZED HEALTH CARE EDUCATION/TRAINING PROGRAM

## 2024-04-16 PROCEDURE — 93010 ELECTROCARDIOGRAM REPORT: CPT | Performed by: INTERNAL MEDICINE

## 2024-04-16 PROCEDURE — 1036F TOBACCO NON-USER: CPT | Performed by: STUDENT IN AN ORGANIZED HEALTH CARE EDUCATION/TRAINING PROGRAM

## 2024-04-16 PROCEDURE — 3078F DIAST BP <80 MM HG: CPT | Performed by: STUDENT IN AN ORGANIZED HEALTH CARE EDUCATION/TRAINING PROGRAM

## 2024-04-16 PROCEDURE — 93005 ELECTROCARDIOGRAM TRACING: CPT | Performed by: STUDENT IN AN ORGANIZED HEALTH CARE EDUCATION/TRAINING PROGRAM

## 2024-04-16 PROCEDURE — 99214 OFFICE O/P EST MOD 30 MIN: CPT | Mod: 25 | Performed by: STUDENT IN AN ORGANIZED HEALTH CARE EDUCATION/TRAINING PROGRAM

## 2024-04-16 RX ORDER — LOSARTAN POTASSIUM AND HYDROCHLOROTHIAZIDE 25; 100 MG/1; MG/1
1 TABLET ORAL DAILY
Qty: 30 TABLET | Refills: 11 | Status: SHIPPED | OUTPATIENT
Start: 2024-04-16 | End: 2025-04-16

## 2024-04-16 NOTE — PROGRESS NOTES
Referred by Dr. Mccallum for [facility]     History Of Present Illness:    Alessandra Beltran is a 58 y.o. female past with history notable for hypertension and melanoma of the skin who recently had an episode of syncope.  In March of this year patient is being treated for pneumonia and developed diarrhea had episode of syncope while sitting.  She was taking Levaquin prior to this.  She has no prior cardiac history.  No preceding chest pain or shortness of breath she has not had subsequent events.  Baseline EKG was sinus rhythm normal axis with appropriate R wave progression.    \Blood pressures have been challenged more recently.  She has been under a lot of stress related to her father who was initially placed in a memory care center and recently passed away.  She is also recently retired in this context.  Due to these changes in blood pressure she was placed initially on losartan which has been increased already to losartan with hydrochlorothiazide.  She denies headaches or chest discomfort.  Denies heavy caffeine consumption or alcohol usage.  Does not smoke.  Pressure today is 130/74 however trends at home stating her diastolics have been elevated mostly in the 90s.    Procedures pressure close    Past Medical History:  She has a past medical history of Allergic rhinitis, unspecified (08/26/2020), Encounter for immunization (04/30/2013), Menopausal and female climacteric states, Personal history of malignant melanoma of skin, and Personal history of peptic ulcer disease.    Past Surgical History:  She has a past surgical history that includes Other surgical history (01/31/2020); Other surgical history (11/10/2014); Esophagogastroduodenoscopy (11/10/2014); Colonoscopy; and Skin surgery.      Social History:  She reports that she has never smoked. She has never been exposed to tobacco smoke. She has never used smokeless tobacco. She reports that she does not use drugs. No history on file for alcohol  "use.    Family History:  Family History   Family history unknown: Yes        Allergies:  Amoxicillin and Erythromycin    Review of Systems   All other systems reviewed and are negative.    Outpatient Medications:  Current Outpatient Medications   Medication Instructions    albuterol 90 mcg/actuation inhaler 2 puffs, inhalation, Every 6 hours PRN    atorvastatin (LIPITOR) 10 mg, oral, Daily    calcium carbonate 600 mg, oral, As needed    cholecalciferol (VITAMIN D-3) 2,000 Units, oral, Daily    cyanocobalamin, vitamin B-12, (Vitamin B-12) 1,000 mcg tablet extended release 1 tablet, oral, Daily    dicyclomine (BENTYL) 20 mg, oral, 3 times daily    loratadine 10 mg, oral, Daily    losartan-hydrochlorothiazide (Hyzaar) 100-25 mg tablet 1 tablet, oral, Daily    mometasone (Nasonex) 50 mcg/actuation nasal spray 1 spray, Each Nostril, Daily    montelukast (SINGULAIR) 10 mg, oral, Daily        Last Recorded Vitals:  Vitals:    04/16/24 1402   BP: 130/74   BP Location: Left arm   Patient Position: Sitting   BP Cuff Size: Adult   Pulse: 82   SpO2: 98%   Weight: 65.3 kg (144 lb)   Height: 1.727 m (5' 8\")       Physical Exam:  General: No acute distress,  A&O x3  Skin: Warm and dry  Neck: JVD is not elevated  ENT: Moist mucous membranes no lesions appreciated  Pulmonary: CTAB  Cards: Regular rate rhythm, no murmurs gallops or rubs appreciated normal S1-S2  Abdomen: Soft nontender nondistended  Extremities: No edema or cyanosis  Psych: Appropriate mood and affect          Last Labs:  CBC -  Lab Results   Component Value Date    WBC 5.0 03/19/2024    HGB 15.4 03/19/2024    HCT 45.9 03/19/2024    MCV 97 03/19/2024     03/19/2024       CMP -  Lab Results   Component Value Date    CALCIUM 10.0 03/19/2024    PROT 7.2 03/19/2024    ALBUMIN 4.8 03/19/2024    AST 14 03/19/2024    ALT 14 03/19/2024    ALKPHOS 60 03/19/2024    BILITOT 0.7 03/19/2024       LIPID PANEL -   Lab Results   Component Value Date    CHOL 207 (H) 03/07/2024 "    TRIG 64 03/07/2024    HDL 96.0 03/07/2024    CHHDL 2.2 03/07/2024    LDLF 120 (H) 07/21/2023    VLDL 13 03/07/2024    NHDL 111 03/07/2024       RENAL FUNCTION PANEL -   Lab Results   Component Value Date    GLUCOSE 120 (H) 03/19/2024     03/19/2024    K 4.0 03/19/2024    CL 97 (L) 03/19/2024    CO2 30 03/19/2024    ANIONGAP 13 03/19/2024    BUN 18 03/19/2024    CREATININE 0.84 03/19/2024    CALCIUM 10.0 03/19/2024    ALBUMIN 4.8 03/19/2024        Lab Results   Component Value Date    HGBA1C 5.6 03/07/2024         Assessment/Plan     1.  Syncope: Probably orthostatic in the setting of diarrhea poor oral intake from viral/bacterial illness.  Baseline ECG is sinus rhythm normal axis and appropriate R wave progression.  No subsequent events.    2.  Hypertension: Diastolics remain elevated.  Will maximize current therapy with losartan-hydrochlorothiazide 100-25 mg daily.  Recheck BMP in 2 weeks with nursing visit for blood pressure check.  Echo ordered to assess ventricular function    3. hyperlipidemia: Patient is currently on atorvastatin 10 mg    (This note was generated with voice recognition software and may contain errors including spelling, grammar, syntax and missed recognition of what was dictated, of which may not have been fully corrected)     Ector Contreras MD PhD

## 2024-04-22 LAB
ATRIAL RATE: 78 BPM
P AXIS: 78 DEGREES
P OFFSET: 199 MS
P ONSET: 145 MS
PR INTERVAL: 146 MS
Q ONSET: 218 MS
QRS COUNT: 13 BEATS
QRS DURATION: 90 MS
QT INTERVAL: 388 MS
QTC CALCULATION(BAZETT): 442 MS
QTC FREDERICIA: 423 MS
R AXIS: 76 DEGREES
T AXIS: 45 DEGREES
T OFFSET: 412 MS
VENTRICULAR RATE: 78 BPM

## 2024-04-29 ENCOUNTER — APPOINTMENT (OUTPATIENT)
Dept: CARDIOLOGY | Facility: CLINIC | Age: 59
End: 2024-04-29
Payer: COMMERCIAL

## 2024-05-02 ENCOUNTER — LAB (OUTPATIENT)
Dept: LAB | Facility: LAB | Age: 59
End: 2024-05-02
Payer: COMMERCIAL

## 2024-05-02 ENCOUNTER — APPOINTMENT (OUTPATIENT)
Dept: CARDIOLOGY | Facility: CLINIC | Age: 59
End: 2024-05-02
Payer: COMMERCIAL

## 2024-05-02 DIAGNOSIS — E83.110 HEREDITARY HEMOCHROMATOSIS (CMS-HCC): ICD-10-CM

## 2024-05-02 LAB
BASOPHILS # BLD AUTO: 0.04 X10*3/UL (ref 0–0.1)
BASOPHILS NFR BLD AUTO: 0.9 %
EOSINOPHIL # BLD AUTO: 0.11 X10*3/UL (ref 0–0.7)
EOSINOPHIL NFR BLD AUTO: 2.6 %
ERYTHROCYTE [DISTWIDTH] IN BLOOD BY AUTOMATED COUNT: 12.4 % (ref 11.5–14.5)
HCT VFR BLD AUTO: 44.3 % (ref 36–46)
HGB BLD-MCNC: 14.5 G/DL (ref 12–16)
IMM GRANULOCYTES # BLD AUTO: 0.01 X10*3/UL (ref 0–0.7)
IMM GRANULOCYTES NFR BLD AUTO: 0.2 % (ref 0–0.9)
LYMPHOCYTES # BLD AUTO: 1.9 X10*3/UL (ref 1.2–4.8)
LYMPHOCYTES NFR BLD AUTO: 45 %
MCH RBC QN AUTO: 32.1 PG (ref 26–34)
MCHC RBC AUTO-ENTMCNC: 32.7 G/DL (ref 32–36)
MCV RBC AUTO: 98 FL (ref 80–100)
MONOCYTES # BLD AUTO: 0.44 X10*3/UL (ref 0.1–1)
MONOCYTES NFR BLD AUTO: 10.4 %
NEUTROPHILS # BLD AUTO: 1.72 X10*3/UL (ref 1.2–7.7)
NEUTROPHILS NFR BLD AUTO: 40.9 %
NRBC BLD-RTO: 0 /100 WBCS (ref 0–0)
PLATELET # BLD AUTO: 212 X10*3/UL (ref 150–450)
RBC # BLD AUTO: 4.52 X10*6/UL (ref 4–5.2)
WBC # BLD AUTO: 4.2 X10*3/UL (ref 4.4–11.3)

## 2024-05-02 PROCEDURE — 80048 BASIC METABOLIC PNL TOTAL CA: CPT

## 2024-05-02 PROCEDURE — 85025 COMPLETE CBC W/AUTO DIFF WBC: CPT

## 2024-05-02 PROCEDURE — 82728 ASSAY OF FERRITIN: CPT

## 2024-05-02 PROCEDURE — 83540 ASSAY OF IRON: CPT

## 2024-05-02 PROCEDURE — 83550 IRON BINDING TEST: CPT

## 2024-05-02 PROCEDURE — 36415 COLL VENOUS BLD VENIPUNCTURE: CPT

## 2024-05-03 LAB
ANION GAP SERPL CALC-SCNC: 15 MMOL/L (ref 10–20)
BUN SERPL-MCNC: 16 MG/DL (ref 6–23)
CALCIUM SERPL-MCNC: 10.4 MG/DL (ref 8.6–10.6)
CHLORIDE SERPL-SCNC: 100 MMOL/L (ref 98–107)
CO2 SERPL-SCNC: 28 MMOL/L (ref 21–32)
CREAT SERPL-MCNC: 0.71 MG/DL (ref 0.5–1.05)
EGFRCR SERPLBLD CKD-EPI 2021: >90 ML/MIN/1.73M*2
FERRITIN SERPL-MCNC: 279 NG/ML (ref 8–150)
GLUCOSE SERPL-MCNC: 103 MG/DL (ref 74–99)
IRON SATN MFR SERPL: 37 % (ref 25–45)
IRON SERPL-MCNC: 149 UG/DL (ref 35–150)
POTASSIUM SERPL-SCNC: 4.1 MMOL/L (ref 3.5–5.3)
SODIUM SERPL-SCNC: 139 MMOL/L (ref 136–145)
TIBC SERPL-MCNC: 400 UG/DL (ref 240–445)
UIBC SERPL-MCNC: 251 UG/DL (ref 110–370)

## 2024-05-06 ENCOUNTER — TELEPHONE (OUTPATIENT)
Dept: HEMATOLOGY/ONCOLOGY | Facility: CLINIC | Age: 59
End: 2024-05-06
Payer: COMMERCIAL

## 2024-05-06 ENCOUNTER — OFFICE VISIT (OUTPATIENT)
Dept: CARDIOLOGY | Facility: CLINIC | Age: 59
End: 2024-05-06
Payer: COMMERCIAL

## 2024-05-06 VITALS
DIASTOLIC BLOOD PRESSURE: 85 MMHG | HEART RATE: 83 BPM | BODY MASS INDEX: 21.82 KG/M2 | OXYGEN SATURATION: 96 % | HEIGHT: 68 IN | WEIGHT: 144 LBS | SYSTOLIC BLOOD PRESSURE: 136 MMHG

## 2024-05-06 DIAGNOSIS — I10 PRIMARY HYPERTENSION: Primary | ICD-10-CM

## 2024-05-06 PROBLEM — R19.7 DIARRHEA OF PRESUMED INFECTIOUS ORIGIN: Status: ACTIVE | Noted: 2024-05-06

## 2024-05-06 PROCEDURE — 3079F DIAST BP 80-89 MM HG: CPT | Performed by: NURSE PRACTITIONER

## 2024-05-06 PROCEDURE — 3075F SYST BP GE 130 - 139MM HG: CPT | Performed by: NURSE PRACTITIONER

## 2024-05-06 PROCEDURE — 99212 OFFICE O/P EST SF 10 MIN: CPT | Performed by: NURSE PRACTITIONER

## 2024-05-06 NOTE — TELEPHONE ENCOUNTER
Dr Chamorro patient. Patient called says had blood work and she is questioning her 5-7-24 appointment for Phlebotomy if she even needs it. She wants nurse to look at her Ferritin blood work she had in the system. Please call her @ [phone]

## 2024-05-06 NOTE — PROGRESS NOTES
Alessandra Beltran is a 58 y.o. female     History Of Present Illness   Mrs Beltran is a 58 year old female with hypertension, here to recheck her BP after medications adjustments.  She recently lost her father.  She has been monitoring her BP after her medication and her Bp 109/68 to 120/82.  She denies any complaints of chest pain, shortness of breath, palpitations, dizziness or syncope.  She denies any headaches.        Social HX  Social History     Tobacco Use    Smoking status: Never     Passive exposure: Never    Smokeless tobacco: Never   Vaping Use    Vaping status: Never Used   Substance Use Topics    Drug use: Never          Family HX  Family History   Family history unknown: Yes          Review Of Systems   Constitutional: not feeling tired.   Eyes: no eyesight problems.  No vision loss or change in vision  ENT: no hearing loss and no nosebleeds.   Cardiovascular: No intermittent leg claudication,   No chest pain, no tightness or heavy pressure  No shortness of breath,  No palpitations,  No lower extremity edema  The heart rate is regular  Respiratory: no chronic cough and no shortness of breath.   Gastrointestinal: no change in bowel habits and no blood in stools.   Genitourinary: no urinary frequency.   Skin: no skin rashes.   Neurological: No frequent falls.   No dizziness  No weakness  Denies headaches  Psychiatric: no depression and not suicidal.   All other systems have been reviewed and are negative for complaint.         Allergies  Allergies   Allergen Reactions    Amoxicillin Unknown    Erythromycin Unknown          Vitals  Visit Vitals  /85 (BP Location: Left arm, Patient Position: Sitting)   Pulse 83           Constitutional: alert and in no acute distress.   Eyes: no erythema, swelling or discharge from the eye .   Neck: neck is supple, symmetric, trachea midline, no masses  and no thyromegaly .   Pulmonary: No increased work of breathing or signs of respiratory distress    Lungs  clear to auscultation.    No friction rub.  Cardiovascular: carotid pulses 2+ bilaterally with no bruit    JVP was normal, no thrills ,   Regular rhythm, normal S1 and S2, no murmurs    Pedal pulses 2+ bilaterally    No edema .   Abdomen: abdomen non-tender, no masses  and no hepatomegaly . No pulsatile mass noted  Skin: skin warm and dry, normal skin turgor .   Psychiatric judgment and insight is normal  and oriented to person, place and time .           Current/Home Meds    Current Outpatient Medications:     albuterol 90 mcg/actuation inhaler, Inhale 2 puffs every 6 hours if needed for wheezing., Disp: 18 g, Rfl: 0    atorvastatin (Lipitor) 10 mg tablet, Take 1 tablet (10 mg) by mouth once daily., Disp: 90 tablet, Rfl: 1    calcium carbonate 600 mg calcium (1,500 mg) tablet, Take 1 tablet (600 mg) by mouth if needed., Disp: , Rfl:     cholecalciferol (Vitamin D-3) 50 mcg (2,000 unit) capsule, Take 1 capsule (50 mcg) by mouth early in the morning.., Disp: , Rfl:     cyanocobalamin, vitamin B-12, (Vitamin B-12) 1,000 mcg tablet extended release, Take 1 tablet (1,000 mcg) by mouth once daily., Disp: , Rfl:     dicyclomine (Bentyl) 20 mg tablet, Take 1 tablet (20 mg) by mouth 3 times a day., Disp: 90 tablet, Rfl: 1    loratadine 10 mg capsule, Take 10 mg by mouth once daily., Disp: , Rfl:     losartan-hydrochlorothiazide (Hyzaar) 100-25 mg tablet, Take 1 tablet by mouth once daily., Disp: 30 tablet, Rfl: 11    mometasone (Nasonex) 50 mcg/actuation nasal spray, Administer 1 spray into each nostril once daily., Disp: , Rfl:     montelukast (Singulair) 10 mg tablet, Take 1 tablet (10 mg) by mouth once daily., Disp: 90 tablet, Rfl: 1       Labs     5/7/20245  CHOLESTEROL 207  HDL 96  LDL 98  TG 65            Cardiac Service Results:  No results found for this or any previous visit from the past 365 days.        Assessment/Plan      HYPERTENSION:  Bp today is 136/85 in the office, but now very well controlled at home.   She is very happy with her home BP readings and denies any complaints.  She is to cotninue losartan/hydrochlorothiazide.  She will continue a low sodium diet and exercise for 150 mins a week.  She will follow up with Dr Contreras as directed.  She can call me sooner for any questions, concerns or complaints.

## 2024-05-06 NOTE — TELEPHONE ENCOUNTER
Call returned to patient.  Labs reviewed by phone.  Noted phlebotomy is scheduled at 8:30 and FUV at 9:30.  Patient is concerned she may not need phlebotomy.  Parametes noted as Ferritin 50 or above and Hgb 14 or above.  Per note provider recommending phlebotomy with ferritin greater  Will discuss with charge nurse moving appt to after FUV per patient request to allow provider to review labs with patient and advise regarding phlebotomy.  Patient inquiring if she needs appt if phlebotomy is not needed.  Informed patient provider will need to discuss results and advise if phlebotomy would be necessary.  Patient agreeable. Call back instructions reviewed.  Patient verbalized understanding.

## 2024-05-07 ENCOUNTER — OFFICE VISIT (OUTPATIENT)
Dept: HEMATOLOGY/ONCOLOGY | Facility: CLINIC | Age: 59
End: 2024-05-07
Payer: COMMERCIAL

## 2024-05-07 ENCOUNTER — TELEPHONE (OUTPATIENT)
Dept: HEMATOLOGY/ONCOLOGY | Facility: CLINIC | Age: 59
End: 2024-05-07

## 2024-05-07 ENCOUNTER — INFUSION (OUTPATIENT)
Dept: HEMATOLOGY/ONCOLOGY | Facility: CLINIC | Age: 59
End: 2024-05-07
Payer: COMMERCIAL

## 2024-05-07 VITALS
HEART RATE: 78 BPM | SYSTOLIC BLOOD PRESSURE: 141 MMHG | TEMPERATURE: 98.4 F | OXYGEN SATURATION: 98 % | BODY MASS INDEX: 22.02 KG/M2 | DIASTOLIC BLOOD PRESSURE: 90 MMHG | WEIGHT: 144.84 LBS

## 2024-05-07 DIAGNOSIS — E83.110 HEREDITARY HEMOCHROMATOSIS (CMS-HCC): ICD-10-CM

## 2024-05-07 PROCEDURE — 3080F DIAST BP >= 90 MM HG: CPT | Performed by: INTERNAL MEDICINE

## 2024-05-07 PROCEDURE — 3077F SYST BP >= 140 MM HG: CPT | Performed by: INTERNAL MEDICINE

## 2024-05-07 PROCEDURE — 99213 OFFICE O/P EST LOW 20 MIN: CPT | Performed by: INTERNAL MEDICINE

## 2024-05-07 ASSESSMENT — ENCOUNTER SYMPTOMS
OCCASIONAL FEELINGS OF UNSTEADINESS: 0
LOSS OF SENSATION IN FEET: 0
DEPRESSION: 0

## 2024-05-07 ASSESSMENT — PAIN SCALES - GENERAL: PAINLEVEL: 0-NO PAIN

## 2024-05-07 NOTE — PROGRESS NOTES
Patient ID: Alessandra Beltran is a 58 y.o. female.  Referring Physician: Srinath Chamorro MD  [address] [facility]  [place] [address]  Primary Care Provider: Charity Mccallum MD        Patient Instructions:     No phlebotomy from hematology standpoint at this time but she can donate via blood bank if she is interested.    Return for follow-up in 1 year  CBC, CMP, fasting iron group and ferritin at that time        ASSESSMENT, PROBLEM LIST, DECISION MAKING, PLAN.    1. Hereditary hemochromatosis/heterozygous C282 Y mutation diagnosed in April 2023 patient is on watchful waiting As her ferritin is 165      PAST MEDICAL HISTORY:    Recurrent superficial melanoma for which she had several excisions going back to 2010 when it was at  and recently has been followed by dermatologist at [facility], history of basal cell skin cancer removed, peptic ulcer disease, false positive hepatitis C in the past but it was confirmatory test hepatitis C negative at  in January 2018 repeat hepatitis C testing in April 2022 was negative, history of anxiety,          INTERVAL HISTORY :   Patient returns today for follow-up after she had a work-up done for hemochromatosis.  Patient was found to be hypertensive for which she was placed on antihypertensive medication,  Patient later had an episode of syncope, EKG and cardiac CT was negative, patient is planning echo and has been seen by Dr. Contreras just to make sure there is no cardiac etiology although she is otherwise doing well and denies any other new complaint.        PHYSICAL EXAM:  General: Conscious, alert, oriented x3, not in acute distress.  HEENT:    No icterus.    Chest:Bilateral symmetrical,     CVS:  S1, S2.    Abdomen:  Soft, no palpable mass   Extremities: No clubbing, cyanosis,     Skin: No petechial rash.            ASSESSMENT & PLAN:  Patient with mild persistently elevated iron saturation and ferritin had a hemochromatosis profile  checked and she is heterozygous for hemochromatosis C282 Y mutation.    She is clinically doing well, has no sign or symptoms of end organ damage related to hemochromatosis,   Her ferritin is stable   At this time her ferritin is still below 300 and does not require phlebotomy and I explained to the patient that unless her ferritin goes above 1000 there is very little risk of liver damage,.  Patient understands, I explained to the patient that blood bank/[facility] now accepts blood from patient with hemochromatosis and it may not be a bad idea to consider blood donation.    She is agreeable   So if she starts donating blood, we will check her CBC, liver functions and fasting iron studies in 1 year at the same time if she decide not to go for blood donation she will have lab work tested in 6 months.          advised to avoid excessive oral iron intake including significant alcohol use.     We will periodically check her iron studies and will do phlebotomy if needed.             LABS:  Lab on 05/02/2024   Component Date Value Ref Range Status    WBC 05/02/2024 4.2 (L)  4.4 - 11.3 x10*3/uL Final    nRBC 05/02/2024 0.0  0.0 - 0.0 /100 WBCs Final    RBC 05/02/2024 4.52  4.00 - 5.20 x10*6/uL Final    Hemoglobin 05/02/2024 14.5  12.0 - 16.0 g/dL Final    Hematocrit 05/02/2024 44.3  36.0 - 46.0 % Final    MCV 05/02/2024 98  80 - 100 fL Final    MCH 05/02/2024 32.1  26.0 - 34.0 pg Final    MCHC 05/02/2024 32.7  32.0 - 36.0 g/dL Final    RDW 05/02/2024 12.4  11.5 - 14.5 % Final    Platelets 05/02/2024 212  150 - 450 x10*3/uL Final    Neutrophils % 05/02/2024 40.9  40.0 - 80.0 % Final    Immature Granulocytes %, Automated 05/02/2024 0.2  0.0 - 0.9 % Final    Lymphocytes % 05/02/2024 45.0  13.0 - 44.0 % Final    Monocytes % 05/02/2024 10.4  2.0 - 10.0 % Final    Eosinophils % 05/02/2024 2.6  0.0 - 6.0 % Final    Basophils % 05/02/2024 0.9  0.0 - 2.0 % Final    Neutrophils Absolute 05/02/2024 1.72  1.20 - 7.70 x10*3/uL Final     Immature Granulocytes Absolute, Au* 05/02/2024 0.01  0.00 - 0.70 x10*3/uL Final    Lymphocytes Absolute 05/02/2024 1.90  1.20 - 4.80 x10*3/uL Final    Monocytes Absolute 05/02/2024 0.44  0.10 - 1.00 x10*3/uL Final    Eosinophils Absolute 05/02/2024 0.11  0.00 - 0.70 x10*3/uL Final    Basophils Absolute 05/02/2024 0.04  0.00 - 0.10 x10*3/uL Final    Glucose 05/02/2024 103 (H)  74 - 99 mg/dL Final    Sodium 05/02/2024 139  136 - 145 mmol/L Final    Potassium 05/02/2024 4.1  3.5 - 5.3 mmol/L Final    Chloride 05/02/2024 100  98 - 107 mmol/L Final    Bicarbonate 05/02/2024 28  21 - 32 mmol/L Final    Anion Gap 05/02/2024 15  10 - 20 mmol/L Final    Urea Nitrogen 05/02/2024 16  6 - 23 mg/dL Final    Creatinine 05/02/2024 0.71  0.50 - 1.05 mg/dL Final    eGFR 05/02/2024 >90  >60 mL/min/1.73m*2 Final    Calcium 05/02/2024 10.4  8.6 - 10.6 mg/dL Final    Ferritin 05/02/2024 279 (H)  8 - 150 ng/mL Final    Iron 05/02/2024 149  35 - 150 ug/dL Final    UIBC 05/02/2024 251  110 - 370 ug/dL Final    TIBC 05/02/2024 400  240 - 445 ug/dL Final    % Saturation 05/02/2024 37  25 - 45 % Final   Office Visit on 04/16/2024   Component Date Value Ref Range Status    Ventricular Rate 04/16/2024 78  BPM Final    Atrial Rate 04/16/2024 78  BPM Final    VA Interval 04/16/2024 146  ms Final    QRS Duration 04/16/2024 90  ms Final    QT Interval 04/16/2024 388  ms Final    QTC Calculation(Bazett) 04/16/2024 442  ms Final    P Axis 04/16/2024 78  degrees Final    R Axis 04/16/2024 76  degrees Final    T Axis 04/16/2024 45  degrees Final    QRS Count 04/16/2024 13  beats Final    Q Onset 04/16/2024 218  ms Final    P Onset 04/16/2024 145  ms Final    P Offset 04/16/2024 199  ms Final    T Offset 04/16/2024 412  ms Final    QTC Fredericia 04/16/2024 423  ms Final       IMAGING:  No results found.    VITALS: BSA: 1.78 meters squared /90   Pulse 78   Temp 36.9 °C (98.4 °F) (Temporal)   Wt 65.7 kg (144 lb 13.5 oz)   SpO2 98%   BMI 22.02  kg/m²     Current Outpatient Medications   Medication Sig Dispense Refill    albuterol 90 mcg/actuation inhaler Inhale 2 puffs every 6 hours if needed for wheezing. 18 g 0    atorvastatin (Lipitor) 10 mg tablet Take 1 tablet (10 mg) by mouth once daily. 90 tablet 1    calcium carbonate 600 mg calcium (1,500 mg) tablet Take 1 tablet (600 mg) by mouth if needed.      cholecalciferol (Vitamin D-3) 50 mcg (2,000 unit) capsule Take 1 capsule (50 mcg) by mouth early in the morning..      cyanocobalamin, vitamin B-12, (Vitamin B-12) 1,000 mcg tablet extended release Take 1 tablet (1,000 mcg) by mouth once daily.      dicyclomine (Bentyl) 20 mg tablet Take 1 tablet (20 mg) by mouth 3 times a day. 90 tablet 1    loratadine 10 mg capsule Take 10 mg by mouth once daily.      losartan-hydrochlorothiazide (Hyzaar) 100-25 mg tablet Take 1 tablet by mouth once daily. 30 tablet 11    mometasone (Nasonex) 50 mcg/actuation nasal spray Administer 1 spray into each nostril once daily.      montelukast (Singulair) 10 mg tablet Take 1 tablet (10 mg) by mouth once daily. 90 tablet 1     No current facility-administered medications for this visit.       ALLERGY: Amoxicillin and Erythromycin    SOCIAL HISTORY: She  has no history on file for alcohol use. She  reports no history of drug use.    Allergy none although has intolerance to erythromycin causes GERD symptoms    Significant family history of melanoma in mother, brother, paternal grandfather  of liver problems in his 70s    Denies smoking, drinks 2 glasses of wine per night, denies any illicit drug use(1)        Medication reviewed in e-chart  Patient is monitored for medication toxicity  labs reviewed and interpreted independently, X rays independently reviewed  Notes from other physicians involved in care were reviewed    Charting was completed using voice recognition technology and may include unintended errors.    ADELINE ELAM MD, TERENCE.    Max Orourke  Master Clinician in Hematology and Oncology  Medical Director, [facility] at [facility].  [place] office  Phone [phone]  Fax      [phone]  [facility] /[place].  Phone [phone]  Fax     [phone]

## 2024-05-07 NOTE — TELEPHONE ENCOUNTER
Dr. Chamorro patient. Patient called wanted to talked to the nurse Rudy again about [place] blood Donation. Please call her @ [phone]

## 2024-05-07 NOTE — PROGRESS NOTES
No phlebotomy today.  Patient is going to donate at [facility].  Follow-up in 1 year with labs prior.  Call back instructions reviewed.  Patient verbalized understanding.

## 2024-05-09 ENCOUNTER — HOSPITAL ENCOUNTER (OUTPATIENT)
Dept: CARDIOLOGY | Facility: CLINIC | Age: 59
Discharge: HOME | End: 2024-05-09
Payer: COMMERCIAL

## 2024-05-09 DIAGNOSIS — E78.2 MIXED HYPERLIPIDEMIA: ICD-10-CM

## 2024-05-09 DIAGNOSIS — I10 PRIMARY HYPERTENSION: ICD-10-CM

## 2024-05-09 DIAGNOSIS — R55 SYNCOPE, UNSPECIFIED SYNCOPE TYPE: ICD-10-CM

## 2024-05-09 LAB
AORTIC VALVE MEAN GRADIENT: 3.1 MMHG
AORTIC VALVE PEAK VELOCITY: 1.06 M/S
AV PEAK GRADIENT: 4.5 MMHG
AVA (PEAK VEL): 2.81 CM2
AVA (VTI): 3.11 CM2
EJECTION FRACTION APICAL 4 CHAMBER: 67.9
LEFT ATRIUM VOLUME AREA LENGTH INDEX BSA: 24.9 ML/M2
LEFT VENTRICLE INTERNAL DIMENSION DIASTOLE: 3.82 CM (ref 3.5–6)
LEFT VENTRICULAR OUTFLOW TRACT DIAMETER: 1.93 CM
LV EJECTION FRACTION BIPLANE: 67 %
MITRAL VALVE E/A RATIO: 1.3
MITRAL VALVE E/E' RATIO: 8.51
RIGHT VENTRICLE FREE WALL PEAK S': 15.23 CM/S
TRICUSPID ANNULAR PLANE SYSTOLIC EXCURSION: 2.4 CM

## 2024-05-09 PROCEDURE — 93306 TTE W/DOPPLER COMPLETE: CPT | Performed by: STUDENT IN AN ORGANIZED HEALTH CARE EDUCATION/TRAINING PROGRAM

## 2024-05-09 PROCEDURE — 93306 TTE W/DOPPLER COMPLETE: CPT

## 2024-05-09 NOTE — TELEPHONE ENCOUNTER
"Call returned to patient.  Patient states [facility] notified her the reason she was previously ineligible was because she was \"Hep c antibody reactive\".  States she went through testing in 2017 and was tested for Hep c and was negative.  She is going to have additional testing in May with the [place] which could take up to 4 months.  Patient will keep us informed of process. Will notify provider.  Call back instructions reviewed.  Patient verbalized understanding.     "

## 2024-05-20 ENCOUNTER — OFFICE VISIT (OUTPATIENT)
Dept: PRIMARY CARE | Facility: CLINIC | Age: 59
End: 2024-05-20
Payer: COMMERCIAL

## 2024-05-20 VITALS
BODY MASS INDEX: 21.33 KG/M2 | OXYGEN SATURATION: 98 % | WEIGHT: 144 LBS | SYSTOLIC BLOOD PRESSURE: 128 MMHG | HEIGHT: 69 IN | HEART RATE: 85 BPM | TEMPERATURE: 98.4 F | DIASTOLIC BLOOD PRESSURE: 86 MMHG

## 2024-05-20 DIAGNOSIS — R79.89 ABNORMAL CBC: Primary | ICD-10-CM

## 2024-05-20 DIAGNOSIS — Z00.00 ROUTINE GENERAL MEDICAL EXAMINATION AT A HEALTH CARE FACILITY: ICD-10-CM

## 2024-05-20 DIAGNOSIS — R79.9 ABNORMAL BLOOD CHEMISTRY: ICD-10-CM

## 2024-05-20 DIAGNOSIS — E78.2 MIXED HYPERLIPIDEMIA: ICD-10-CM

## 2024-05-20 DIAGNOSIS — R73.9 HYPERGLYCEMIA: ICD-10-CM

## 2024-05-20 PROCEDURE — 3074F SYST BP LT 130 MM HG: CPT | Performed by: INTERNAL MEDICINE

## 2024-05-20 PROCEDURE — 1036F TOBACCO NON-USER: CPT | Performed by: INTERNAL MEDICINE

## 2024-05-20 PROCEDURE — 3079F DIAST BP 80-89 MM HG: CPT | Performed by: INTERNAL MEDICINE

## 2024-05-20 PROCEDURE — 99214 OFFICE O/P EST MOD 30 MIN: CPT | Performed by: INTERNAL MEDICINE

## 2024-05-20 ASSESSMENT — ENCOUNTER SYMPTOMS
DEPRESSION: 0
OCCASIONAL FEELINGS OF UNSTEADINESS: 0
LOSS OF SENSATION IN FEET: 0

## 2024-05-20 ASSESSMENT — PATIENT HEALTH QUESTIONNAIRE - PHQ9
1. LITTLE INTEREST OR PLEASURE IN DOING THINGS: NOT AT ALL
SUM OF ALL RESPONSES TO PHQ9 QUESTIONS 1 AND 2: 0
2. FEELING DOWN, DEPRESSED OR HOPELESS: NOT AT ALL

## 2024-05-20 NOTE — PROGRESS NOTES
"Subjective   Patient ID: Alessandra Beltran is a 58 y.o. female who presents for Follow-up (EP.  Follow up on BP.  Blood pressure readings have been good.  Going to [place] Wednesday to get testing.  Needs hep c results from old system.).  HPI  Here for fu hypertension.  She has been on losartan/HCTZ and doing well.  She had evaluation with cardiology and they were pleased with her results.  She has had an echocardiogram which we reviewed in the office today as well as office visit notes.  She has follow-up with me in July.  She did not get her results yet but we have gone over them with her and she will discuss them with cardiology  They are helping as her stress levels decrease that she will be able to lower her doses of blood pressure medication   Saw dr lim for iron and keeps watching that she has not had phlebotomy with him yet   Has dx hemochromatosis  and dr lim says now can donate blood to there can [employer] that is new as of September 2023 so she would like to start doing that but she has initiated   Was kicked out in 2017 because had reactive hep c ab she had follow-up hepatitis C virus PCR that is negative but she has not been able to donate blood again there is a process that she can go through which will take 3 to 4 months to get reapproved as a donor they feel perhaps it was related to the hemochromatosis and a cross-reactivity she is going to embark on that process and share copies with us  She has no chest pain headaches dizziness lightheadedness or shortness of breath she has no lower extremity edema she has been feeling well she has a routine follow-up appointment coming up with her melanoma specialist  Review of Systems  Review of systems was performed and is otherwise negative except as noted in HPI.  Objective   /86   Pulse 85   Temp 36.9 °C (98.4 °F) (Oral)   Ht 1.74 m (5' 8.5\")   Wt 65.3 kg (144 lb)   SpO2 98%   BMI 21.58 kg/m²    Physical Exam  HEENT is " normal  Lungs clear bilaterally  Heart is regular rate rhythm no murmurs  Abdomen benign  Lower extremities no edema    Assessment/Plan   Diagnoses and all orders for this visit:  Abnormal CBC  -     CBC and Auto Differential; Future  -     Ferritin; Future  -     Iron and TIBC; Future  Abnormal blood chemistry  -     CBC and Auto Differential; Future  -     Ferritin; Future  -     Iron and TIBC; Future  Hyperglycemia  -     Hemoglobin A1C; Future  Routine general medical examination at a health care facility  -     TSH with reflex to Free T4 if abnormal; Future  -     Lipid Panel; Future  Mixed hyperlipidemia  -     Lipid Panel; Future    Call with issues  Blood work ordered  She has a physical scheduled later in the summer  She will continue her blood pressure medicine and work on diet and exercise and stress reduction.    She was given a copy of her hepatitis C virus PCR from 2018 she will do that with her when she goes to see the [place] for new repeat testing  She will discuss this with her hematologist when she does follow-up there as well  Charity Mccallum MD

## 2024-06-18 ENCOUNTER — LAB (OUTPATIENT)
Dept: LAB | Facility: LAB | Age: 59
End: 2024-06-18
Payer: COMMERCIAL

## 2024-06-18 DIAGNOSIS — R73.9 HYPERGLYCEMIA: ICD-10-CM

## 2024-06-18 DIAGNOSIS — Z00.00 ROUTINE GENERAL MEDICAL EXAMINATION AT A HEALTH CARE FACILITY: ICD-10-CM

## 2024-06-18 DIAGNOSIS — R79.89 ABNORMAL CBC: ICD-10-CM

## 2024-06-18 DIAGNOSIS — R79.9 ABNORMAL BLOOD CHEMISTRY: ICD-10-CM

## 2024-06-18 DIAGNOSIS — E78.2 MIXED HYPERLIPIDEMIA: ICD-10-CM

## 2024-06-18 LAB
BASOPHILS # BLD AUTO: 0.06 X10*3/UL (ref 0–0.1)
BASOPHILS NFR BLD AUTO: 1.3 %
CHOLEST SERPL-MCNC: 219 MG/DL (ref 0–199)
CHOLESTEROL/HDL RATIO: 2.3
EOSINOPHIL # BLD AUTO: 0.09 X10*3/UL (ref 0–0.7)
EOSINOPHIL NFR BLD AUTO: 1.9 %
ERYTHROCYTE [DISTWIDTH] IN BLOOD BY AUTOMATED COUNT: 12.9 % (ref 11.5–14.5)
EST. AVERAGE GLUCOSE BLD GHB EST-MCNC: 114 MG/DL
FERRITIN SERPL-MCNC: 304 NG/ML (ref 8–150)
HBA1C MFR BLD: 5.6 %
HCT VFR BLD AUTO: 43.2 % (ref 36–46)
HDLC SERPL-MCNC: 93.5 MG/DL
HGB BLD-MCNC: 14.6 G/DL (ref 12–16)
IMM GRANULOCYTES # BLD AUTO: 0.01 X10*3/UL (ref 0–0.7)
IMM GRANULOCYTES NFR BLD AUTO: 0.2 % (ref 0–0.9)
IRON SATN MFR SERPL: 36 % (ref 25–45)
IRON SERPL-MCNC: 122 UG/DL (ref 35–150)
LDLC SERPL CALC-MCNC: 114 MG/DL
LYMPHOCYTES # BLD AUTO: 1.99 X10*3/UL (ref 1.2–4.8)
LYMPHOCYTES NFR BLD AUTO: 43 %
MCH RBC QN AUTO: 34 PG (ref 26–34)
MCHC RBC AUTO-ENTMCNC: 33.8 G/DL (ref 32–36)
MCV RBC AUTO: 101 FL (ref 80–100)
MONOCYTES # BLD AUTO: 0.47 X10*3/UL (ref 0.1–1)
MONOCYTES NFR BLD AUTO: 10.2 %
NEUTROPHILS # BLD AUTO: 2.01 X10*3/UL (ref 1.2–7.7)
NEUTROPHILS NFR BLD AUTO: 43.4 %
NON HDL CHOLESTEROL: 126 MG/DL (ref 0–149)
NRBC BLD-RTO: 0 /100 WBCS (ref 0–0)
PLATELET # BLD AUTO: 220 X10*3/UL (ref 150–450)
RBC # BLD AUTO: 4.29 X10*6/UL (ref 4–5.2)
TIBC SERPL-MCNC: 342 UG/DL (ref 240–445)
TRIGL SERPL-MCNC: 60 MG/DL (ref 0–149)
TSH SERPL-ACNC: 1.08 MIU/L (ref 0.44–3.98)
UIBC SERPL-MCNC: 220 UG/DL (ref 110–370)
VLDL: 12 MG/DL (ref 0–40)
WBC # BLD AUTO: 4.6 X10*3/UL (ref 4.4–11.3)

## 2024-06-18 PROCEDURE — 82728 ASSAY OF FERRITIN: CPT

## 2024-06-18 PROCEDURE — 83036 HEMOGLOBIN GLYCOSYLATED A1C: CPT

## 2024-06-18 PROCEDURE — 85025 COMPLETE CBC W/AUTO DIFF WBC: CPT

## 2024-06-18 PROCEDURE — 83540 ASSAY OF IRON: CPT

## 2024-06-18 PROCEDURE — 36415 COLL VENOUS BLD VENIPUNCTURE: CPT

## 2024-06-18 PROCEDURE — 80061 LIPID PANEL: CPT

## 2024-06-18 PROCEDURE — 83550 IRON BINDING TEST: CPT

## 2024-06-18 PROCEDURE — 84443 ASSAY THYROID STIM HORMONE: CPT

## 2024-06-21 ENCOUNTER — APPOINTMENT (OUTPATIENT)
Dept: PRIMARY CARE | Facility: CLINIC | Age: 59
End: 2024-06-21
Payer: COMMERCIAL

## 2024-06-21 VITALS
WEIGHT: 143 LBS | TEMPERATURE: 97.9 F | SYSTOLIC BLOOD PRESSURE: 124 MMHG | BODY MASS INDEX: 21.18 KG/M2 | HEIGHT: 69 IN | HEART RATE: 80 BPM | OXYGEN SATURATION: 100 % | DIASTOLIC BLOOD PRESSURE: 82 MMHG

## 2024-06-21 DIAGNOSIS — Z00.00 ROUTINE GENERAL MEDICAL EXAMINATION AT A HEALTH CARE FACILITY: Primary | ICD-10-CM

## 2024-06-21 DIAGNOSIS — E78.5 HYPERLIPIDEMIA, UNSPECIFIED HYPERLIPIDEMIA TYPE: ICD-10-CM

## 2024-06-21 DIAGNOSIS — I10 PRIMARY HYPERTENSION: ICD-10-CM

## 2024-06-21 DIAGNOSIS — J30.9 ALLERGIC RHINITIS, UNSPECIFIED SEASONALITY, UNSPECIFIED TRIGGER: ICD-10-CM

## 2024-06-21 DIAGNOSIS — R55 SYNCOPE, UNSPECIFIED SYNCOPE TYPE: ICD-10-CM
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
## 2024-06-21 DIAGNOSIS — E78.2 MIXED HYPERLIPIDEMIA: ICD-10-CM

## 2024-06-21 DIAGNOSIS — Z12.4 ENCOUNTER FOR PAPANICOLAOU SMEAR FOR CERVICAL CANCER SCREENING: ICD-10-CM

## 2024-06-21 PROCEDURE — 3079F DIAST BP 80-89 MM HG: CPT | Performed by: INTERNAL MEDICINE

## 2024-06-21 PROCEDURE — 88175 CYTOPATH C/V AUTO FLUID REDO: CPT

## 2024-06-21 PROCEDURE — 1036F TOBACCO NON-USER: CPT | Performed by: INTERNAL MEDICINE

## 2024-06-21 PROCEDURE — 99396 PREV VISIT EST AGE 40-64: CPT | Performed by: INTERNAL MEDICINE

## 2024-06-21 PROCEDURE — 87624 HPV HI-RISK TYP POOLED RSLT: CPT

## 2024-06-21 PROCEDURE — 3074F SYST BP LT 130 MM HG: CPT | Performed by: INTERNAL MEDICINE

## 2024-06-21 RX ORDER — LOSARTAN POTASSIUM AND HYDROCHLOROTHIAZIDE 25; 100 MG/1; MG/1
1 TABLET ORAL DAILY
Qty: 90 TABLET | Refills: 1 | Status: SHIPPED | OUTPATIENT
Start: 2024-06-21 | End: 2025-06-21

## 2024-06-21 RX ORDER — DOXYCYCLINE 100 MG/1
CAPSULE ORAL
COMMUNITY
Start: 2024-06-19

## 2024-06-21 RX ORDER — PREDNISONE 10 MG/1
TABLET ORAL
COMMUNITY
Start: 2024-06-19

## 2024-06-21 RX ORDER — ATORVASTATIN CALCIUM 10 MG/1
10 TABLET, FILM COATED ORAL DAILY
Qty: 90 TABLET | Refills: 1 | Status: SHIPPED | OUTPATIENT
Start: 2024-06-21

## 2024-06-21 RX ORDER — PREDNISONE 10 MG/1
TABLET ORAL
Qty: 30 TABLET | Refills: 0 | Status: SHIPPED | OUTPATIENT
Start: 2024-06-21

## 2024-06-21 RX ORDER — MONTELUKAST SODIUM 10 MG/1
10 TABLET ORAL DAILY
Qty: 90 TABLET | Refills: 1 | Status: SHIPPED | OUTPATIENT
Start: 2024-06-21

## 2024-06-21 ASSESSMENT — PATIENT HEALTH QUESTIONNAIRE - PHQ9
2. FEELING DOWN, DEPRESSED OR HOPELESS: NOT AT ALL
1. LITTLE INTEREST OR PLEASURE IN DOING THINGS: NOT AT ALL
SUM OF ALL RESPONSES TO PHQ9 QUESTIONS 1 AND 2: 0

## 2024-06-21 ASSESSMENT — ENCOUNTER SYMPTOMS
OCCASIONAL FEELINGS OF UNSTEADINESS: 0
LOSS OF SENSATION IN FEET: 0
DEPRESSION: 0

## 2024-06-21 NOTE — PROGRESS NOTES
Subjective   Patient ID: Alessandra Beltran is a 58 y.o. female who presents for Gynecologic Exam (EP.  WWE with pap.  Labs done.  Stung by yellow jacket 2 days ago under L are and had reaction.  Started prednisone 10 mg and doxycycline 100 mg.).  HPI  Patient is here for annual check-up    Last well check  1 yr     Reported health   fair     Dental  check  reg  Vision check reg   Vision issues n  Hearing issues n  Vaccines UTD  y    Diet healthy   Exercise reg  Caffeine  coffee  Alcohol yes  Tobacco never    Colon cancer screening  2021    Sees derm reg  has a bump in left ear    Current issues:  recent yellow jacket sting   Had left over refill after  3 stings  last October   This time got stung again in same area on post left axilla      Review of Systems      GENERAL - Denies fever, fatigue or chills  SKIN - Denies rash, new skin lesions, or change in moles  EYES - Denies blurred vision, or change in visual acuity  EARS - Denies ear pain, discharge, ringing, or difficulty hearing  NOSE - Denies nasal congestion, discharge, or bleeding  MOUTH - Denies sore throat, postnasal drip or painful/difficulty swallowing  NECK - Denies pain or swelling  RESPIRATORY - Denies shortness of breath, cough, wheezing  CARDIOVASCULAR - Denies palpitations, chest pain, orthopnea, peripheral edema, syncope or claudication  GASTROINTESTINAL - Denies nausea, vomiting, diarrhea, constipation, abdominal pain, melena and or bright red blood  GENITOURINARY - Denies dysuria, frequency of urination, urgency, or hesitancy  MUSCULOSKELETAL - Denies joint or muscle pain, or back pain  NEUROLOGICAL - Denies localized numbness, weakness, or tingling  PSYCHIATRIC - Denies depression, anxiety, substance abuse, suicidal or homicidal ideation  ENDOCRINE - Denies heat or cold intolerance, weight loss or gain, increasing thirst  HEMATO-IMMUNOLOGIC - Denies easy bruising, bleeding, oral ulcerations or recurrent infections     Objective   /82    "Pulse 80   Temp 36.6 °C (97.9 °F) (Oral)   Ht 1.74 m (5' 8.5\")   Wt 64.9 kg (143 lb)   SpO2 100%   BMI 21.43 kg/m²    Physical Exam      CONSTITUTIONAL - well nourished, well developed, looks like stated age, in no acute distress, not ill-appearing, and not tired appearing  SKIN - normal skin color and pigmentation, normal skin turgor without rash, lesions, or nodules visualized  has a sting post left arm  w surrounding redness   HEAD - no trauma, normocephalic  EYES  -  nl b  nl fundi  ENT - TM's intact, no injection, no exudate,  nasal passage without discharge and patent  Breast nl b no masses axilla normal   NECK - supple without rigidity, no neck mass was observed, no thyromegaly or thyroid nodules  CHEST - clear to auscultation, no wheezing, no crackles and no rales, good effort  CARDIAC - regular rate and regular rhythm, no skipped beats, no murmur  ABDOMEN - no organomegaly, soft, nontender, nondistended, normal bowel sounds, no guarding/rebound/rigidity  Gu nl ext and internal and no  masses   EXTREMITIES - no edema, no deformities  NEUROLOGICAL -  non focal no deficits   PSYCHIATRIC - alert, pleasant and cordial, age-appropriate  LYMPHATIC- no cervical lymphadenopathy  Multiple scarring   b upper  arms   Assessment/Plan   Diagnoses and all orders for this visit:  Routine general medical examination at a health care facility  Encounter for Papanicolaou smear for cervical cancer screening  -     THINPREP PAP TEST  Allergic rhinitis, unspecified seasonality, unspecified trigger  -     montelukast (Singulair) 10 mg tablet; Take 1 tablet (10 mg) by mouth once daily.  Hyperlipidemia, unspecified hyperlipidemia type  -     atorvastatin (Lipitor) 10 mg tablet; Take 1 tablet (10 mg) by mouth once daily.  Syncope, unspecified syncope type  -     losartan-hydrochlorothiazide (Hyzaar) 100-25 mg tablet; Take 1 tablet by mouth once daily.  Primary hypertension  -     losartan-hydrochlorothiazide (Hyzaar) 100-25 mg " tablet; Take 1 tablet by mouth once daily.  Mixed hyperlipidemia  -     losartan-hydrochlorothiazide (Hyzaar) 100-25 mg tablet; Take 1 tablet by mouth once daily.  -     Lipid Panel; Future  -     Comprehensive Metabolic Panel; Future  Insect bite, unspecified site, subsequent encounter  -     predniSONE (Deltasone) 10 mg tablet; Take 4 po every day x 3 days then 3 po every day x 3 days then 2 po every day x 3 days then 1 po every day x 3 days    Call w issues   Fu 6 mos    Bw ordered    Refills sent     Fu w  hematology  re ferritin and trying to to resume blood donation w [facility]  Charity Mccallum MD

## 2024-07-02 ENCOUNTER — TELEPHONE (OUTPATIENT)
Dept: PRIMARY CARE | Facility: CLINIC | Age: 59
End: 2024-07-02
Payer: COMMERCIAL

## 2024-07-02 DIAGNOSIS — K64.9 HEMORRHOIDS, UNSPECIFIED HEMORRHOID TYPE: Primary | ICD-10-CM

## 2024-07-02 RX ORDER — HYDROCORTISONE ACETATE 25 MG/1
25 SUPPOSITORY RECTAL 2 TIMES DAILY PRN
Qty: 14 SUPPOSITORY | Refills: 0 | Status: SHIPPED | OUTPATIENT
Start: 2024-07-02 | End: 2024-07-09

## 2024-07-02 NOTE — TELEPHONE ENCOUNTER
Patient called in stating she had no BM for 3 days and then when she finally did she got a hemorrhoid and has had it for 9 days and it is outside of the skin and she has tried everything.  Please advise    She also tested [facility] for hep c antibodies and is no longer eligible to donate. Even though testing here was negative.

## 2024-07-03 DIAGNOSIS — Z12.31 SCREENING MAMMOGRAM FOR BREAST CANCER: ICD-10-CM

## 2024-07-03 LAB
CYTOLOGY CMNT CVX/VAG CYTO-IMP: NORMAL
HPV HR 12 DNA GENITAL QL NAA+PROBE: NEGATIVE
HPV HR GENOTYPES PNL CVX NAA+PROBE: NEGATIVE
HPV16 DNA SPEC QL NAA+PROBE: NEGATIVE
HPV18 DNA SPEC QL NAA+PROBE: NEGATIVE
LAB AP HPV GENOTYPE QUESTION: YES
LAB AP HPV HR: NORMAL
LABORATORY COMMENT REPORT: NORMAL
PATH REPORT.TOTAL CANCER: NORMAL

## 2024-07-08 ENCOUNTER — HOSPITAL ENCOUNTER (OUTPATIENT)
Dept: RADIOLOGY | Facility: CLINIC | Age: 59
Discharge: HOME | End: 2024-07-08
Payer: COMMERCIAL

## 2024-07-08 VITALS — WEIGHT: 140 LBS | BODY MASS INDEX: 20.73 KG/M2 | HEIGHT: 69 IN

## 2024-07-08 DIAGNOSIS — Z12.31 SCREENING MAMMOGRAM FOR BREAST CANCER: ICD-10-CM

## 2024-07-08 PROCEDURE — 77067 SCR MAMMO BI INCL CAD: CPT

## 2024-07-08 PROCEDURE — 77063 BREAST TOMOSYNTHESIS BI: CPT | Performed by: RADIOLOGY

## 2024-07-08 PROCEDURE — 77067 SCR MAMMO BI INCL CAD: CPT | Performed by: RADIOLOGY

## 2024-07-09 ENCOUNTER — OFFICE VISIT (OUTPATIENT)
Dept: CARDIOLOGY | Facility: CLINIC | Age: 59
End: 2024-07-09
Payer: COMMERCIAL

## 2024-07-09 ENCOUNTER — TELEPHONE (OUTPATIENT)
Dept: PRIMARY CARE | Facility: CLINIC | Age: 59
End: 2024-07-09

## 2024-07-09 VITALS
SYSTOLIC BLOOD PRESSURE: 121 MMHG | WEIGHT: 143 LBS | OXYGEN SATURATION: 98 % | HEART RATE: 82 BPM | BODY MASS INDEX: 21.18 KG/M2 | HEIGHT: 69 IN | DIASTOLIC BLOOD PRESSURE: 78 MMHG

## 2024-07-09 DIAGNOSIS — E78.2 MIXED HYPERLIPIDEMIA: Primary | ICD-10-CM

## 2024-07-09 DIAGNOSIS — I10 PRIMARY HYPERTENSION: ICD-10-CM

## 2024-07-09 PROCEDURE — 3074F SYST BP LT 130 MM HG: CPT | Performed by: STUDENT IN AN ORGANIZED HEALTH CARE EDUCATION/TRAINING PROGRAM

## 2024-07-09 PROCEDURE — 99213 OFFICE O/P EST LOW 20 MIN: CPT | Performed by: STUDENT IN AN ORGANIZED HEALTH CARE EDUCATION/TRAINING PROGRAM

## 2024-07-09 PROCEDURE — 1036F TOBACCO NON-USER: CPT | Performed by: STUDENT IN AN ORGANIZED HEALTH CARE EDUCATION/TRAINING PROGRAM

## 2024-07-09 PROCEDURE — 3078F DIAST BP <80 MM HG: CPT | Performed by: STUDENT IN AN ORGANIZED HEALTH CARE EDUCATION/TRAINING PROGRAM

## 2024-07-09 ASSESSMENT — PAIN SCALES - GENERAL: PAINLEVEL: 0-NO PAIN

## 2024-07-09 NOTE — PROGRESS NOTES
"Chief Complaint:   Follow-up     History Of Present Illness:    Alessandra Beltran is a 58 y.o. female returns for follow-up appointment.  Patient has been doing well since last appointment.  Blood pressure has been far better controlled with the adjustment to her antihypertensive therapy.  She has had no side effects.  Blood pressure today is 121/78 heart rate of 82 saturation 96% weight 143 pounds.    Last Recorded Vitals:  Vitals:    07/09/24 1110   BP: 121/78   BP Location: Left arm   BP Cuff Size: Adult   Pulse: 82   SpO2: 98%   Weight: 64.9 kg (143 lb)   Height: 1.74 m (5' 8.5\")       Review of Systems  ROS      Allergies:  Amoxicillin, Bee venom protein (honey bee), and Erythromycin    Outpatient Medications:  Current Outpatient Medications   Medication Instructions    atorvastatin (LIPITOR) 10 mg, oral, Daily    calcium carbonate 600 mg, oral, As needed    cholecalciferol (VITAMIN D-3) 2,000 Units, oral, Daily    cyanocobalamin, vitamin B-12, (Vitamin B-12) 1,000 mcg tablet extended release 1 tablet, oral, Daily    dicyclomine (BENTYL) 20 mg, oral, 3 times daily    doxycycline (Vibramycin) 100 mg capsule TAKE 2 CAPSULES BY MOUTH TO START  THEN TAKE 1 CAPSULE BY MOUTH TWICE DAILY UNTIL FINISHED    hydrocortisone (ANUSOL-HC) 25 mg, rectal, 2 times daily PRN    loratadine 10 mg, oral, Daily    losartan-hydrochlorothiazide (Hyzaar) 100-25 mg tablet 1 tablet, oral, Daily    mometasone (Nasonex) 50 mcg/actuation nasal spray 1 spray, Each Nostril, Daily    montelukast (SINGULAIR) 10 mg, oral, Daily    predniSONE (Deltasone) 10 mg tablet TAKE 3 TABLETS BY MOUTH ONCE DAILY FOR 3 DAYS  THEN TAKE 2 TABLETS ONCE DAILY FOR 3 DAYS  THEN TAKE 1 TABLET DAILY FOR 3 DAYS.    predniSONE (Deltasone) 10 mg tablet Take 4 po every day x 3 days then 3 po every day x 3 days then 2 po every day x 3 days then 1 po every day x 3 days       Physical Exam:  General: No acute distress,  A&O x3  Skin: Warm and dry  Neck: JVD is not " elevated  ENT: Moist mucous membranes no lesions appreciated  Pulmonary: CTAB  Cards: Regular rate rhythm, no murmurs gallops or rubs appreciated normal S1-S2  Abdomen: Soft nontender nondistended  Extremities: No edema or cyanosis  Psych: Appropriate mood and affect        Last Labs:  CBC -  Lab Results   Component Value Date    WBC 4.6 06/18/2024    HGB 14.6 06/18/2024    HCT 43.2 06/18/2024     (H) 06/18/2024     06/18/2024       CMP -  Lab Results   Component Value Date    CALCIUM 10.4 05/02/2024    PROT 7.2 03/19/2024    ALBUMIN 4.8 03/19/2024    AST 14 03/19/2024    ALT 14 03/19/2024    ALKPHOS 60 03/19/2024    BILITOT 0.7 03/19/2024       LIPID PANEL -   Lab Results   Component Value Date    CHOL 219 (H) 06/18/2024    TRIG 60 06/18/2024    HDL 93.5 06/18/2024    CHHDL 2.3 06/18/2024    LDLF 120 (H) 07/21/2023    VLDL 12 06/18/2024    NHDL 126 06/18/2024       RENAL FUNCTION PANEL -   Lab Results   Component Value Date    GLUCOSE 103 (H) 05/02/2024     05/02/2024    K 4.1 05/02/2024     05/02/2024    CO2 28 05/02/2024    ANIONGAP 15 05/02/2024    BUN 16 05/02/2024    CREATININE 0.71 05/02/2024    CALCIUM 10.4 05/02/2024    ALBUMIN 4.8 03/19/2024        Lab Results   Component Value Date    HGBA1C 5.6 06/18/2024       Last Cardiology Tests:  ECG:  Encounter Date: 04/16/24   ECG 12 lead (Clinic Performed)   Result Value    Ventricular Rate 78    Atrial Rate 78    [place] Interval 146    QRS Duration 90    QT Interval 388    QTC Calculation(Bazett) 442    P Axis 78    R Axis 76    T Axis 45    QRS Count 13    Q Onset 218    P Onset 145    P Offset 199    T Offset 412    QTC Fredericia 423    Narrative    Normal sinus rhythm  Normal ECG  When compared with ECG of 22-NOV-2023 08:01,  No significant change was found  Confirmed by Devan Sargent ([id number]) on 4/22/2024 3:02:47 PM        Echo:  No results found for this or any previous visit from the past 1095 days.     Assessment and Plan    1.   Syncope: Probably orthostatic in the setting of diarrhea poor oral intake from viral/bacterial illness.  Baseline ECG is sinus rhythm normal axis and appropriate R wave progression.  No subsequent events.     2.  Hypertension: Substantially improved with medication adjustment.  Labs are stable.  Echo shows normal ejection fraction significant valvular pathology.     3. hyperlipidemia: Patient is currently on atorvastatin 10 mg.  Calcium score 0     Follow-up in 1 year    (This note was generated with voice recognition software and may contain errors including spelling, grammar, syntax and missed recognition of what was dictated, of which may not have been fully corrected)     Ector Contreras MD PhD

## 2024-07-12 ENCOUNTER — OFFICE (OUTPATIENT)
Dept: URBAN - METROPOLITAN AREA CLINIC 26 | Facility: CLINIC | Age: 59
End: 2024-07-12

## 2024-07-12 VITALS
TEMPERATURE: 98.6 F | HEART RATE: 79 BPM | WEIGHT: 143 LBS | HEIGHT: 69 IN | DIASTOLIC BLOOD PRESSURE: 85 MMHG | SYSTOLIC BLOOD PRESSURE: 128 MMHG

## 2024-07-12 DIAGNOSIS — K64.9 UNSPECIFIED HEMORRHOIDS: ICD-10-CM

## 2024-07-12 DIAGNOSIS — E83.119 HEMOCHROMATOSIS, UNSPECIFIED: ICD-10-CM

## 2024-07-12 PROCEDURE — 99204 OFFICE O/P NEW MOD 45 MIN: CPT | Performed by: CLINICAL NURSE SPECIALIST

## 2024-07-12 RX ORDER — HYDROCORTISONE 25 MG/G
CREAM TOPICAL
Qty: 30 | Refills: 1 | Status: ACTIVE
Start: 2024-07-12

## 2024-07-16 ENCOUNTER — TELEPHONE (OUTPATIENT)
Dept: PRIMARY CARE | Facility: CLINIC | Age: 59
End: 2024-07-16
Payer: COMMERCIAL

## 2024-07-16 NOTE — TELEPHONE ENCOUNTER
Patient stated she had a office visit with GI and her hemorrhoid is a external hemorrhoid and not a internal hemorrhoid and that she was prescribed a hydrocortisone 2.5% cream she has been using now for 3 days.  She has a follow up appointment with GI on 8/2/24.

## 2024-08-01 ENCOUNTER — OFFICE (OUTPATIENT)
Dept: URBAN - METROPOLITAN AREA CLINIC 26 | Facility: CLINIC | Age: 59
End: 2024-08-01
Payer: COMMERCIAL

## 2024-08-01 VITALS
SYSTOLIC BLOOD PRESSURE: 130 MMHG | DIASTOLIC BLOOD PRESSURE: 76 MMHG | HEIGHT: 69 IN | HEART RATE: 82 BPM | WEIGHT: 143 LBS

## 2024-08-01 DIAGNOSIS — E83.119 HEMOCHROMATOSIS, UNSPECIFIED: ICD-10-CM

## 2024-08-01 DIAGNOSIS — K64.9 UNSPECIFIED HEMORRHOIDS: ICD-10-CM

## 2024-08-01 PROCEDURE — 99213 OFFICE O/P EST LOW 20 MIN: CPT | Performed by: CLINICAL NURSE SPECIALIST

## 2024-08-13 ENCOUNTER — APPOINTMENT (OUTPATIENT)
Dept: CARDIOLOGY | Facility: CLINIC | Age: 59
End: 2024-08-13
Payer: COMMERCIAL

## 2024-12-11 ENCOUNTER — LAB (OUTPATIENT)
Dept: LAB | Facility: LAB | Age: 59
End: 2024-12-11
Payer: COMMERCIAL

## 2024-12-11 DIAGNOSIS — E78.2 MIXED HYPERLIPIDEMIA: ICD-10-CM

## 2024-12-11 LAB
ALBUMIN SERPL BCP-MCNC: 4.7 G/DL (ref 3.4–5)
ALP SERPL-CCNC: 47 U/L (ref 33–110)
ALT SERPL W P-5'-P-CCNC: 16 U/L (ref 7–45)
ANION GAP SERPL CALC-SCNC: 14 MMOL/L (ref 10–20)
AST SERPL W P-5'-P-CCNC: 16 U/L (ref 9–39)
BILIRUB SERPL-MCNC: 0.7 MG/DL (ref 0–1.2)
BUN SERPL-MCNC: 22 MG/DL (ref 6–23)
CALCIUM SERPL-MCNC: 10.1 MG/DL (ref 8.6–10.6)
CHLORIDE SERPL-SCNC: 99 MMOL/L (ref 98–107)
CHOLEST SERPL-MCNC: 214 MG/DL (ref 0–199)
CHOLESTEROL/HDL RATIO: 3.2
CO2 SERPL-SCNC: 30 MMOL/L (ref 21–32)
CREAT SERPL-MCNC: 0.92 MG/DL (ref 0.5–1.05)
EGFRCR SERPLBLD CKD-EPI 2021: 72 ML/MIN/1.73M*2
GLUCOSE SERPL-MCNC: 106 MG/DL (ref 74–99)
HDLC SERPL-MCNC: 66.9 MG/DL
LDLC SERPL CALC-MCNC: 130 MG/DL
NON HDL CHOLESTEROL: 147 MG/DL (ref 0–149)
POTASSIUM SERPL-SCNC: 3.8 MMOL/L (ref 3.5–5.3)
PROT SERPL-MCNC: 7 G/DL (ref 6.4–8.2)
SODIUM SERPL-SCNC: 139 MMOL/L (ref 136–145)
TRIGL SERPL-MCNC: 87 MG/DL (ref 0–149)
VLDL: 17 MG/DL (ref 0–40)

## 2024-12-11 PROCEDURE — 80061 LIPID PANEL: CPT

## 2024-12-11 PROCEDURE — 80053 COMPREHEN METABOLIC PANEL: CPT

## 2024-12-11 PROCEDURE — 36415 COLL VENOUS BLD VENIPUNCTURE: CPT

## 2024-12-17 ENCOUNTER — APPOINTMENT (OUTPATIENT)
Dept: PRIMARY CARE | Facility: CLINIC | Age: 59
End: 2024-12-17
Payer: COMMERCIAL

## 2024-12-17 VITALS
HEART RATE: 82 BPM | SYSTOLIC BLOOD PRESSURE: 122 MMHG | HEIGHT: 69 IN | TEMPERATURE: 98.3 F | DIASTOLIC BLOOD PRESSURE: 78 MMHG | WEIGHT: 143 LBS | OXYGEN SATURATION: 97 % | BODY MASS INDEX: 21.18 KG/M2

## 2024-12-17 DIAGNOSIS — E78.2 MIXED HYPERLIPIDEMIA: ICD-10-CM

## 2024-12-17 DIAGNOSIS — E78.5 HYPERLIPIDEMIA, UNSPECIFIED HYPERLIPIDEMIA TYPE: ICD-10-CM

## 2024-12-17 DIAGNOSIS — R55 SYNCOPE, UNSPECIFIED SYNCOPE TYPE: ICD-10-CM

## 2024-12-17 DIAGNOSIS — K58.9 IRRITABLE BOWEL SYNDROME, UNSPECIFIED TYPE: ICD-10-CM

## 2024-12-17 DIAGNOSIS — I10 PRIMARY HYPERTENSION: ICD-10-CM

## 2024-12-17 DIAGNOSIS — J30.9 ALLERGIC RHINITIS, UNSPECIFIED SEASONALITY, UNSPECIFIED TRIGGER: ICD-10-CM

## 2024-12-17 DIAGNOSIS — R21 RASH: Primary | ICD-10-CM

## 2024-12-17 PROCEDURE — 1036F TOBACCO NON-USER: CPT | Performed by: INTERNAL MEDICINE

## 2024-12-17 PROCEDURE — 3074F SYST BP LT 130 MM HG: CPT | Performed by: INTERNAL MEDICINE

## 2024-12-17 PROCEDURE — 3008F BODY MASS INDEX DOCD: CPT | Performed by: INTERNAL MEDICINE

## 2024-12-17 PROCEDURE — 3078F DIAST BP <80 MM HG: CPT | Performed by: INTERNAL MEDICINE

## 2024-12-17 PROCEDURE — 99214 OFFICE O/P EST MOD 30 MIN: CPT | Performed by: INTERNAL MEDICINE

## 2024-12-17 RX ORDER — MONTELUKAST SODIUM 10 MG/1
10 TABLET ORAL DAILY
Qty: 90 TABLET | Refills: 1 | Status: SHIPPED | OUTPATIENT
Start: 2024-12-17

## 2024-12-17 RX ORDER — ATORVASTATIN CALCIUM 10 MG/1
10 TABLET, FILM COATED ORAL DAILY
Qty: 90 TABLET | Refills: 1 | Status: SHIPPED | OUTPATIENT
Start: 2024-12-17

## 2024-12-17 RX ORDER — HYDROCORTISONE 25 MG/G
CREAM TOPICAL 2 TIMES DAILY PRN
Qty: 30 G | Refills: 0 | Status: SHIPPED | OUTPATIENT
Start: 2024-12-17 | End: 2024-12-27

## 2024-12-17 RX ORDER — LOSARTAN POTASSIUM AND HYDROCHLOROTHIAZIDE 25; 100 MG/1; MG/1
1 TABLET ORAL DAILY
Qty: 90 TABLET | Refills: 1 | Status: SHIPPED | OUTPATIENT
Start: 2024-12-17 | End: 2025-12-17

## 2024-12-17 RX ORDER — DICYCLOMINE HYDROCHLORIDE 20 MG/1
20 TABLET ORAL 3 TIMES DAILY
Qty: 90 TABLET | Refills: 1 | Status: SHIPPED | OUTPATIENT
Start: 2024-12-17

## 2024-12-17 ASSESSMENT — ENCOUNTER SYMPTOMS
OCCASIONAL FEELINGS OF UNSTEADINESS: 0
DEPRESSION: 0
LOSS OF SENSATION IN FEET: 0

## 2024-12-17 NOTE — PROGRESS NOTES
"Subjective   Patient ID: Alessandra Beltran is a 59 y.o. female who presents for Follow-up (EP.  Follow up BP.  Labs done. Rash on both ankles since the weekend.).  HPI  Patient presents today for follow-up of hypertension hyperlipidemia.  She has been feeling well.  She needs refills on her medications.  She has a new rash on her lower shins there since the weekend.  She has areas of redness that jeferson as well as areas of petechiae above on the left and areas of blanching on the right.  Her  thought it was from the boots that she was wearing but she was not wearing boots over the weekend when this rash started.  She denies consciously itching though she states she could be itching in her sleep.  She denies chest pains headaches dizziness lightheadedness shortness of breath or lower extremity edema she has been compliant with her medications  She states pertaining to the rash she has not had any new environment she has had not new exposures no new meds she is not otherwise ill      Review of Systems  Review of systems was performed and is otherwise negative except as noted in HPI.      Objective   /78   Pulse 82   Temp 36.8 °C (98.3 °F) (Oral)   Ht 1.74 m (5' 8.5\")   Wt 64.9 kg (143 lb)   SpO2 97%   BMI 21.43 kg/m²      Physical Exam  HEENT is normal  Lungs clear bilaterally  Heart is regular rate rhythm no murmurs  Abdomen benign  Lower extremities no edema   She has a blanching rash on bilateral ankles she has petechiae on the left shin  Assessment/Plan   Diagnoses and all orders for this visit:  Rash  -     Iron and TIBC; Future  -     Ferritin; Future  -     CBC and Auto Differential; Future  -     hydrocortisone 2.5 % cream; Apply topically 2 times a day as needed for irritation or rash for up to 10 days.  Hyperlipidemia, unspecified hyperlipidemia type  -     atorvastatin (Lipitor) 10 mg tablet; Take 1 tablet (10 mg) by mouth once daily.  Allergic rhinitis, unspecified seasonality, " unspecified trigger  -     montelukast (Singulair) 10 mg tablet; Take 1 tablet (10 mg) by mouth once daily.  Syncope, unspecified syncope type  -     losartan-hydrochlorothiazide (Hyzaar) 100-25 mg tablet; Take 1 tablet by mouth once daily.  Primary hypertension  -     losartan-hydrochlorothiazide (Hyzaar) 100-25 mg tablet; Take 1 tablet by mouth once daily.  Mixed hyperlipidemia  -     losartan-hydrochlorothiazide (Hyzaar) 100-25 mg tablet; Take 1 tablet by mouth once daily.  Irritable bowel syndrome, unspecified type  -     dicyclomine (Bentyl) 20 mg tablet; Take 1 tablet (20 mg) by mouth 3 times a day.    Refills are sent  Blood work is reviewed  Blood work is ordered  Follow-up with me in 6 months or sooner as needed per insurance  If leg symptoms persist she may need to see dermatology  Charity Mccallum MD

## 2024-12-27 ENCOUNTER — LAB (OUTPATIENT)
Dept: LAB | Facility: LAB | Age: 59
End: 2024-12-27
Payer: COMMERCIAL

## 2024-12-27 DIAGNOSIS — R21 RASH: ICD-10-CM

## 2024-12-27 LAB
BASOPHILS # BLD AUTO: 0.03 X10*3/UL (ref 0–0.1)
BASOPHILS NFR BLD AUTO: 0.8 %
EOSINOPHIL # BLD AUTO: 0.1 X10*3/UL (ref 0–0.7)
EOSINOPHIL NFR BLD AUTO: 2.7 %
ERYTHROCYTE [DISTWIDTH] IN BLOOD BY AUTOMATED COUNT: 11.4 % (ref 11.5–14.5)
FERRITIN SERPL-MCNC: 342 NG/ML (ref 8–150)
HCT VFR BLD AUTO: 37.8 % (ref 36–46)
HGB BLD-MCNC: 13.2 G/DL (ref 12–16)
IMM GRANULOCYTES # BLD AUTO: 0.01 X10*3/UL (ref 0–0.7)
IMM GRANULOCYTES NFR BLD AUTO: 0.3 % (ref 0–0.9)
IRON SATN MFR SERPL: 37 % (ref 25–45)
IRON SERPL-MCNC: 124 UG/DL (ref 35–150)
LYMPHOCYTES # BLD AUTO: 1.87 X10*3/UL (ref 1.2–4.8)
LYMPHOCYTES NFR BLD AUTO: 50 %
MCH RBC QN AUTO: 33.5 PG (ref 26–34)
MCHC RBC AUTO-ENTMCNC: 34.9 G/DL (ref 32–36)
MCV RBC AUTO: 96 FL (ref 80–100)
MONOCYTES # BLD AUTO: 0.38 X10*3/UL (ref 0.1–1)
MONOCYTES NFR BLD AUTO: 10.2 %
NEUTROPHILS # BLD AUTO: 1.35 X10*3/UL (ref 1.2–7.7)
NEUTROPHILS NFR BLD AUTO: 36 %
NRBC BLD-RTO: 0 /100 WBCS (ref 0–0)
PLATELET # BLD AUTO: 203 X10*3/UL (ref 150–450)
RBC # BLD AUTO: 3.94 X10*6/UL (ref 4–5.2)
TIBC SERPL-MCNC: 337 UG/DL (ref 240–445)
UIBC SERPL-MCNC: 213 UG/DL (ref 110–370)
WBC # BLD AUTO: 3.7 X10*3/UL (ref 4.4–11.3)

## 2024-12-27 PROCEDURE — 85025 COMPLETE CBC W/AUTO DIFF WBC: CPT

## 2024-12-27 PROCEDURE — 83540 ASSAY OF IRON: CPT

## 2024-12-27 PROCEDURE — 83550 IRON BINDING TEST: CPT

## 2024-12-27 PROCEDURE — 82728 ASSAY OF FERRITIN: CPT

## 2024-12-31 DIAGNOSIS — E78.2 MIXED HYPERLIPIDEMIA: Primary | ICD-10-CM

## 2024-12-31 DIAGNOSIS — R79.89 ABNORMAL CBC: ICD-10-CM

## 2025-01-17 ENCOUNTER — TELEPHONE (OUTPATIENT)
Dept: PRIMARY CARE | Facility: CLINIC | Age: 60
End: 2025-01-17
Payer: COMMERCIAL

## 2025-01-17 DIAGNOSIS — I10 PRIMARY HYPERTENSION: Primary | ICD-10-CM

## 2025-01-17 NOTE — TELEPHONE ENCOUNTER
Alessandra called because she has been getting lightheaded for the past week.     She said she has lost 16 lbs since November 24 and she thinks her BP meds may need changed.     BP readings today are 85/65 and 15 minutes later 83/60    Please let her know if she needs to change her meds or what you suggest.       Alessandra - [phone]

## 2025-01-23 ENCOUNTER — APPOINTMENT (OUTPATIENT)
Dept: PRIMARY CARE | Facility: CLINIC | Age: 60
End: 2025-01-23
Payer: COMMERCIAL

## 2025-01-23 ENCOUNTER — TELEPHONE (OUTPATIENT)
Dept: PRIMARY CARE | Facility: CLINIC | Age: 60
End: 2025-01-23

## 2025-01-23 VITALS — HEART RATE: 61 BPM | SYSTOLIC BLOOD PRESSURE: 106 MMHG | DIASTOLIC BLOOD PRESSURE: 66 MMHG

## 2025-01-23 DIAGNOSIS — I10 PRIMARY HYPERTENSION: ICD-10-CM

## 2025-01-23 DIAGNOSIS — R55 SYNCOPE, UNSPECIFIED SYNCOPE TYPE: ICD-10-CM

## 2025-01-23 DIAGNOSIS — E78.2 MIXED HYPERLIPIDEMIA: ICD-10-CM

## 2025-01-23 PROCEDURE — 99212 OFFICE O/P EST SF 10 MIN: CPT | Performed by: FAMILY MEDICINE

## 2025-01-23 NOTE — TELEPHONE ENCOUNTER
Patient is having issues with constipation.  She had a BM last Wednesday and then nothing and took, miralax and colace and liquid magnesium on Monday and went and has not gone again since but still has been taking colace and is concerned.  She also stated she is leaving for a month and does not have time to make an appt with GI she is very concerned.

## 2025-01-23 NOTE — PROGRESS NOTES
Patient presents today for a BP check.   Patients BP was 85/65 and 83/60 with dizziness on 1/17/25.  Dr. Mccallum had her take Losartan-Hctz 50-12.5.    Patient reports BP has been 111/83, 110/83, 113/79, 124/84, 99/66, 100, 65.  Patients BP today is  106/66.  HR is 61.

## 2025-01-24 RX ORDER — LOSARTAN POTASSIUM AND HYDROCHLOROTHIAZIDE 25; 100 MG/1; MG/1
1 TABLET ORAL DAILY
Qty: 90 TABLET | Refills: 0 | Status: SHIPPED | OUTPATIENT
Start: 2025-01-24 | End: 2026-01-24

## 2025-01-24 RX ORDER — LOSARTAN POTASSIUM 50 MG/1
50 TABLET ORAL DAILY
Qty: 90 TABLET | Refills: 0 | Status: SHIPPED | OUTPATIENT
Start: 2025-01-24 | End: 2025-04-24

## 2025-01-24 NOTE — PROGRESS NOTES
I would like to change her to losartan 50 mg with no HCTZ I will call in a new prescription and she should get her blood pressure checked in a few weeks

## 2025-02-10 ENCOUNTER — TELEPHONE (OUTPATIENT)
Dept: PRIMARY CARE | Facility: CLINIC | Age: 60
End: 2025-02-10
Payer: COMMERCIAL

## 2025-02-10 NOTE — TELEPHONE ENCOUNTER
Pt notified and feeling good.  She started a daily probiotic and takes colace as needed and has a BM every other day now.

## 2025-02-10 NOTE — TELEPHONE ENCOUNTER
Alessandra called with updated BP readings. On Losartan 50 mg just over a week.    BP ranging from 115/73 - 134/83    Alessandra -[phone]

## 2025-03-31 DIAGNOSIS — I10 PRIMARY HYPERTENSION: ICD-10-CM

## 2025-03-31 RX ORDER — LOSARTAN POTASSIUM 50 MG/1
50 TABLET ORAL DAILY
Qty: 90 TABLET | Refills: 0 | Status: SHIPPED | OUTPATIENT
Start: 2025-03-31 | End: 2025-06-29

## 2025-03-31 NOTE — TELEPHONE ENCOUNTER
I need a new prescription (90 days) with refills. My BP is well-controlled on this dosage. [facility] in [place].

## 2025-03-31 NOTE — TELEPHONE ENCOUNTER
LAST APPT 12/17/24  NEXT APPT 05/06/25  LAST BW 12/27/24    PT is requesting the 90+1 I only put in the 90 not sure if you needed current bw to add the +1

## 2025-04-22 ENCOUNTER — TELEPHONE (OUTPATIENT)
Dept: PRIMARY CARE | Facility: CLINIC | Age: 60
End: 2025-04-22
Payer: COMMERCIAL

## 2025-04-22 NOTE — TELEPHONE ENCOUNTER
Pt called [facility] letting you know her insurance will change at the end of May.  She needs to have her b/w done and she asked if you could added to the order.  She mentioned wanting to add a HbA1c.  She asked if you could add what you think it's necessary.    She doesn't want any addition trips to the lab.  She wants to go to [facility] in the next couple of days.

## 2025-04-25 LAB
ALBUMIN SERPL-MCNC: 4.5 G/DL (ref 3.6–5.1)
ALP SERPL-CCNC: 46 U/L (ref 37–153)
ALT SERPL-CCNC: 13 U/L (ref 6–29)
ANION GAP SERPL CALCULATED.4IONS-SCNC: 7 MMOL/L (CALC) (ref 7–17)
AST SERPL-CCNC: 11 U/L (ref 10–35)
BASOPHILS # BLD AUTO: 21 CELLS/UL (ref 0–200)
BASOPHILS NFR BLD AUTO: 0.7 %
BILIRUB SERPL-MCNC: 0.7 MG/DL (ref 0.2–1.2)
BUN SERPL-MCNC: 14 MG/DL (ref 7–25)
CALCIUM SERPL-MCNC: 9.6 MG/DL (ref 8.6–10.4)
CHLORIDE SERPL-SCNC: 107 MMOL/L (ref 98–110)
CHOLEST SERPL-MCNC: 187 MG/DL
CHOLEST/HDLC SERPL: 2.6 (CALC)
CO2 SERPL-SCNC: 30 MMOL/L (ref 20–32)
CREAT SERPL-MCNC: 0.71 MG/DL (ref 0.5–1.05)
EGFRCR SERPLBLD CKD-EPI 2021: 97 ML/MIN/1.73M2
EOSINOPHIL # BLD AUTO: 120 CELLS/UL (ref 15–500)
EOSINOPHIL NFR BLD AUTO: 4 %
ERYTHROCYTE [DISTWIDTH] IN BLOOD BY AUTOMATED COUNT: 11.6 % (ref 11–15)
EST. AVERAGE GLUCOSE BLD GHB EST-MCNC: 123 MG/DL
EST. AVERAGE GLUCOSE BLD GHB EST-SCNC: 6.8 MMOL/L
FERRITIN SERPL-MCNC: 114 NG/ML (ref 16–232)
GLUCOSE SERPL-MCNC: 101 MG/DL (ref 65–99)
HBA1C MFR BLD: 5.9 %
HCT VFR BLD AUTO: 42 % (ref 35–45)
HDLC SERPL-MCNC: 71 MG/DL
HGB BLD-MCNC: 13.9 G/DL (ref 11.7–15.5)
IRON SATN MFR SERPL: 39 % (CALC) (ref 16–45)
IRON SERPL-MCNC: 110 MCG/DL (ref 45–160)
LDLC SERPL CALC-MCNC: 102 MG/DL (CALC)
LYMPHOCYTES # BLD AUTO: 1761 CELLS/UL (ref 850–3900)
LYMPHOCYTES NFR BLD AUTO: 58.7 %
MCH RBC QN AUTO: 31.8 PG (ref 27–33)
MCHC RBC AUTO-ENTMCNC: 33.1 G/DL (ref 32–36)
MCV RBC AUTO: 96.1 FL (ref 80–100)
MONOCYTES # BLD AUTO: 309 CELLS/UL (ref 200–950)
MONOCYTES NFR BLD AUTO: 10.3 %
NEUTROPHILS # BLD AUTO: 789 CELLS/UL (ref 1500–7800)
NEUTROPHILS NFR BLD AUTO: 26.3 %
NONHDLC SERPL-MCNC: 116 MG/DL (CALC)
PLATELET # BLD AUTO: 200 THOUSAND/UL (ref 140–400)
PMV BLD REES-ECKER: 10.1 FL (ref 7.5–12.5)
POTASSIUM SERPL-SCNC: 4.1 MMOL/L (ref 3.5–5.3)
PROT SERPL-MCNC: 6.2 G/DL (ref 6.1–8.1)
RBC # BLD AUTO: 4.37 MILLION/UL (ref 3.8–5.1)
SODIUM SERPL-SCNC: 144 MMOL/L (ref 135–146)
TIBC SERPL-MCNC: 282 MCG/DL (CALC) (ref 250–450)
TRIGL SERPL-MCNC: 48 MG/DL
WBC # BLD AUTO: 3 THOUSAND/UL (ref 3.8–10.8)

## 2025-04-30 ENCOUNTER — PATIENT MESSAGE (OUTPATIENT)
Dept: HEMATOLOGY/ONCOLOGY | Facility: CLINIC | Age: 60
End: 2025-04-30
Payer: COMMERCIAL

## 2025-05-06 ENCOUNTER — OFFICE VISIT (OUTPATIENT)
Dept: HEMATOLOGY/ONCOLOGY | Facility: CLINIC | Age: 60
End: 2025-05-06
Payer: COMMERCIAL

## 2025-05-06 ENCOUNTER — APPOINTMENT (OUTPATIENT)
Dept: PRIMARY CARE | Facility: CLINIC | Age: 60
End: 2025-05-06
Payer: COMMERCIAL

## 2025-05-06 VITALS
WEIGHT: 123 LBS | OXYGEN SATURATION: 97 % | HEART RATE: 77 BPM | BODY MASS INDEX: 18.22 KG/M2 | DIASTOLIC BLOOD PRESSURE: 84 MMHG | TEMPERATURE: 98.3 F | HEIGHT: 69 IN | SYSTOLIC BLOOD PRESSURE: 114 MMHG

## 2025-05-06 VITALS
WEIGHT: 123.68 LBS | DIASTOLIC BLOOD PRESSURE: 86 MMHG | SYSTOLIC BLOOD PRESSURE: 138 MMHG | HEART RATE: 62 BPM | OXYGEN SATURATION: 98 % | BODY MASS INDEX: 18.53 KG/M2 | RESPIRATION RATE: 16 BRPM | TEMPERATURE: 97.3 F

## 2025-05-06 DIAGNOSIS — D70.9 NEUTROPENIA, UNSPECIFIED TYPE: Primary | ICD-10-CM

## 2025-05-06 DIAGNOSIS — I10 PRIMARY HYPERTENSION: Primary | ICD-10-CM

## 2025-05-06 DIAGNOSIS — E83.110 HEREDITARY HEMOCHROMATOSIS (CMS-HCC): ICD-10-CM

## 2025-05-06 DIAGNOSIS — E78.2 MIXED HYPERLIPIDEMIA: ICD-10-CM

## 2025-05-06 DIAGNOSIS — Z12.31 ENCOUNTER FOR SCREENING MAMMOGRAM FOR MALIGNANT NEOPLASM OF BREAST: ICD-10-CM

## 2025-05-06 DIAGNOSIS — R73.01 FASTING HYPERGLYCEMIA: ICD-10-CM

## 2025-05-06 PROCEDURE — 3075F SYST BP GE 130 - 139MM HG: CPT | Performed by: INTERNAL MEDICINE

## 2025-05-06 PROCEDURE — 99214 OFFICE O/P EST MOD 30 MIN: CPT | Performed by: INTERNAL MEDICINE

## 2025-05-06 PROCEDURE — 3008F BODY MASS INDEX DOCD: CPT | Performed by: INTERNAL MEDICINE

## 2025-05-06 PROCEDURE — 3074F SYST BP LT 130 MM HG: CPT | Performed by: INTERNAL MEDICINE

## 2025-05-06 PROCEDURE — 3079F DIAST BP 80-89 MM HG: CPT | Performed by: INTERNAL MEDICINE

## 2025-05-06 PROCEDURE — G2211 COMPLEX E/M VISIT ADD ON: HCPCS | Performed by: INTERNAL MEDICINE

## 2025-05-06 PROCEDURE — 1036F TOBACCO NON-USER: CPT | Performed by: INTERNAL MEDICINE

## 2025-05-06 RX ORDER — BUTYROSPERMUM PARKII(SHEA BUTTER), SIMMONDSIA CHINENSIS (JOJOBA) SEED OIL, ALOE BARBADENSIS LEAF EXTRACT .01; 1; 3.5 G/100G; G/100G; G/100G
250 LIQUID TOPICAL 2 TIMES DAILY
COMMUNITY
End: 2025-05-06 | Stop reason: WASHOUT

## 2025-05-06 ASSESSMENT — ENCOUNTER SYMPTOMS
OCCASIONAL FEELINGS OF UNSTEADINESS: 0
LOSS OF SENSATION IN FEET: 0
DEPRESSION: 0

## 2025-05-06 ASSESSMENT — PAIN SCALES - GENERAL: PAINLEVEL_OUTOF10: 0-NO PAIN

## 2025-05-06 NOTE — PATIENT INSTRUCTIONS
Patient Instructions:      Patient does not require any phlebotomy from hemochromatosis standpoint.  Patient has leukopenia/neutropenia without any clear explanation she has not started any new medications she has not had any other major infection, does not have any sign or symptoms suggestive of autoimmune disorder.     Will check CBC, B12 folate LDH ETTA reticulocyte count and thyroid function test on the day when her primary care physician has ordered CMP, hemoglobin A1c and lipid panel and if she is in fact neutropenic at that time, I will see her back 1 week later if not then she will come back in a year.     Tentative follow-up in first week of August 2025, if her ANC is normal, we can push it but otherwise I will see her that time.

## 2025-05-06 NOTE — PROGRESS NOTES
Per provider no phlebotomy needed at this time.  Patient currently in process of changing insurance to [employer] - Low premium 6000 silver and is concerned that both Dr Chamorro and the lab are in network.  Forwarded to social work to discuss.  Recommended patient reach out to [facility] direct in regards to coverage for [facility].  Labs to be done end of July at [facility].  She willcombine Dr Mccallum labs as well.  Will tentative schedule for follow-up in August.  If ANC WNL will bump this appt out to May 2026.  Call back instructions reviewed.  Patient verbalized understanding.

## 2025-05-06 NOTE — PROGRESS NOTES
Patient ID: Alessandra Beltran is a 59 y.o. female.  Referring Physician: Srniath Chamorro MD  [address]  [place] [address]  Primary Care Provider: Charity Mccallum MD        Patient Instructions:     Patient does not require any phlebotomy from hemochromatosis standpoint.  Patient has leukopenia/neutropenia without any clear explanation she has not started any new medications she has not had any other major infection, does not have any sign or symptoms suggestive of autoimmune disorder.    Will check CBC, B12 folate LDH ETTA reticulocyte count and thyroid function test on the day when her primary care physician has ordered CMP, hemoglobin A1c and lipid panel and if she is in fact neutropenic at that time, I will see her back 1 week later if not then she will come back in a year.    Tentative follow-up in first week of August 2025, if her ANC is normal, we can push it but otherwise I will see her that time.    ASSESSMENT, PROBLEM LIST, DECISION MAKING, PLAN.    1. Hereditary hemochromatosis/heterozygous C282 Y mutation diagnosed in April 2023   patient is on watchful waiting As her ferritin is 165      PAST MEDICAL HISTORY:    Recurrent superficial melanoma for which she had several excisions going back to 2010 when it was at  and recently has been followed by dermatologist at [facility], history of basal cell skin cancer removed, peptic ulcer disease, false positive hepatitis C in the past but it was confirmatory test hepatitis C negative at  in January 2018 repeat hepatitis C testing in April 2022 was negative, history of anxiety,          INTERVAL HISTORY :   Patient returns today for follow-up on hemochromatosis, she has been on watchful waiting, she was incidentally found to have leukopenia and neutropenia without any clear reason when she was seen by primary care physician, she is concerned although denies any recent change in the medication, denies any recent  COVID or any other infection and she is otherwise asymptomatic, she has discontinued alcohol use in December 2024        PHYSICAL EXAM:  General: Conscious, alert, oriented x3, not in acute distress.  HEENT:    No icterus.    Chest:Bilateral symmetrical,     CVS:  S1, S2.    Abdomen:  Soft, no palpable mass   Extremities: No clubbing, cyanosis,     Skin: No petechial rash.            ASSESSMENT & PLAN:  Patient with mild persistently elevated iron saturation and ferritin had a hemochromatosis profile checked and she is heterozygous for hemochromatosis C282 Y mutation.    She is clinically doing well, has no sign or symptoms of end organ damage related to hemochromatosis,   Her ferritin is stable   Ferritin is still below 300 and she is clinically stable there is no evidence of endorgan damage and I do not expect it to create any trouble unless ferritin goes above 1000.    For now we will continue watchful waiting   Patient has discontinued alcohol in December 2024       Patient has leukopenia And neutropenia without any clear explanation with ANC of 790, looking back she had episodic mild neutropenia in range of 1300 sometime in 2023 as well but otherwise was mostly normal so this is somewhat unusual.  At the same time she does not have any history of any major infection.  At this time she is already planning to have CMP lipid panel and hemoglobin A1c by primary care physician on 25 July 2025, we will add repeat CBC ETTA B12 folate hepatitis C antibody LDH RF factor at that time and if her neutropenia resolves, we will not do any additional workup, if not we will do additional testing at that time.  Plan was explained to the patient she agreed with the plan       I have reviewed the record from the past that she has had false positive hepatitis C antibodies although HCV PCR was negative in the past.  And was thought to be false positive              LABS:  No visits with results within 1 Month(s) from this visit.    Latest known visit with results is:   Orders Only on 12/31/2024   Component Date Value Ref Range Status    WHITE BLOOD CELL COUNT 04/24/2025 3.0 (L)  3.8 - 10.8 Thousand/uL Final    RED BLOOD CELL COUNT 04/24/2025 4.37  3.80 - 5.10 Million/uL Final    HEMOGLOBIN 04/24/2025 13.9  11.7 - 15.5 g/dL Final    HEMATOCRIT 04/24/2025 42.0  35.0 - 45.0 % Final    MCV 04/24/2025 96.1  80.0 - 100.0 fL Final    MCH 04/24/2025 31.8  27.0 - 33.0 pg Final    MCHC 04/24/2025 33.1  32.0 - 36.0 g/dL Final    RDW 04/24/2025 11.6  11.0 - 15.0 % Final    PLATELET COUNT 04/24/2025 200  140 - 400 Thousand/uL Final    MPV 04/24/2025 10.1  7.5 - 12.5 fL Final    ABSOLUTE NEUTROPHILS 04/24/2025 789 (L)  1,500 - 7,800 cells/uL Final    ABSOLUTE LYMPHOCYTES 04/24/2025 1,761  850 - 3,900 cells/uL Final    ABSOLUTE MONOCYTES 04/24/2025 309  200 - 950 cells/uL Final    ABSOLUTE EOSINOPHILS 04/24/2025 120  15 - 500 cells/uL Final    ABSOLUTE BASOPHILS 04/24/2025 21  0 - 200 cells/uL Final    NEUTROPHILS 04/24/2025 26.3  % Final    LYMPHOCYTES 04/24/2025 58.7  % Final    MONOCYTES 04/24/2025 10.3  % Final    EOSINOPHILS 04/24/2025 4.0  % Final    BASOPHILS 04/24/2025 0.7  % Final    GLUCOSE 04/24/2025 101 (H)  65 - 99 mg/dL Final    UREA NITROGEN (BUN) 04/24/2025 14  7 - 25 mg/dL Final    CREATININE 04/24/2025 0.71  0.50 - 1.05 mg/dL Final    EGFR 04/24/2025 97  > OR = 60 mL/min/1.73m2 Final    SODIUM 04/24/2025 144  135 - 146 mmol/L Final    POTASSIUM 04/24/2025 4.1  3.5 - 5.3 mmol/L Final    CHLORIDE 04/24/2025 107  98 - 110 mmol/L Final    CARBON DIOXIDE 04/24/2025 30  20 - 32 mmol/L Final    ELECTROLYTE BALANCE 04/24/2025 7  7 - 17 mmol/L (calc) Final    CALCIUM 04/24/2025 9.6  8.6 - 10.4 mg/dL Final    PROTEIN, TOTAL 04/24/2025 6.2  6.1 - 8.1 g/dL Final    ALBUMIN 04/24/2025 4.5  3.6 - 5.1 g/dL Final    BILIRUBIN, TOTAL 04/24/2025 0.7  0.2 - 1.2 mg/dL Final    ALKALINE PHOSPHATASE 04/24/2025 46  37 - 153 U/L Final    AST 04/24/2025 11  10 -  35 U/L Final    ALT 04/24/2025 13  6 - 29 U/L Final    FERRITIN 04/24/2025 114  16 - 232 ng/mL Final    IRON, TOTAL 04/24/2025 110  45 - 160 mcg/dL Final    IRON BINDING CAPACITY 04/24/2025 282  250 - 450 mcg/dL (calc) Final    % SATURATION 04/24/2025 39  16 - 45 % (calc) Final    CHOLESTEROL, TOTAL 04/24/2025 187  <200 mg/dL Final    HDL CHOLESTEROL 04/24/2025 71  > OR = 50 mg/dL Final    TRIGLYCERIDES 04/24/2025 48  <150 mg/dL Final    LDL-CHOLESTEROL 04/24/2025 102 (H)  mg/dL (calc) Final    CHOL/HDLC RATIO 04/24/2025 2.6  <5.0 (calc) Final    NON HDL CHOLESTEROL 04/24/2025 116  <130 mg/dL (calc) Final    HEMOGLOBIN A1c 04/24/2025 5.9 (H)  <5.7 % Final    eAG (mg/dL) 04/24/2025 123  mg/dL Final    eAG (mmol/L) 04/24/2025 6.8  mmol/L Final       IMAGING:  No results found.    VITALS: BSA: 1.65 meters squared /86   Pulse 62   Temp 36.3 °C (97.3 °F)   Resp 16   Wt 56.1 kg (123 lb 10.9 oz)   SpO2 98%   BMI 18.53 kg/m²     Current Outpatient Medications   Medication Sig Dispense Refill    atorvastatin (Lipitor) 10 mg tablet Take 1 tablet (10 mg) by mouth once daily. 90 tablet 1    calcium carbonate 600 mg calcium (1,500 mg) tablet Take 1 tablet (600 mg) by mouth if needed.      cholecalciferol (Vitamin D-3) 50 mcg (2,000 unit) capsule Take 1 capsule (2,000 Units) by mouth early in the morning..      cyanocobalamin, vitamin B-12, (Vitamin B-12) 1,000 mcg tablet extended release Take 1 tablet (1,000 mcg) by mouth once daily.      dicyclomine (Bentyl) 20 mg tablet Take 1 tablet (20 mg) by mouth 3 times a day. 90 tablet 1    loratadine 10 mg capsule Take 10 mg by mouth once daily.      losartan (Cozaar) 50 mg tablet Take 1 tablet (50 mg) by mouth once daily. 90 tablet 0    mometasone (Nasonex) 50 mcg/actuation nasal spray Administer 1 spray into each nostril once daily.      montelukast (Singulair) 10 mg tablet Take 1 tablet (10 mg) by mouth once daily. 90 tablet 1    hydrocortisone 2.5 % cream Apply topically  2 times a day as needed for irritation or rash for up to 10 days. (Patient not taking: Reported on 2025) 30 g 0    saccharomyces boulardii (Florastor) 250 mg capsule Take 1 capsule (250 mg) by mouth 2 times a day. (Patient not taking: Reported on 2025)       No current facility-administered medications for this visit.       ALLERGY: Amoxicillin, Bee venom protein (honey bee), and Erythromycin    SOCIAL HISTORY: She  has no history on file for alcohol use. She  reports no history of drug use.    Allergy none although has intolerance to erythromycin causes GERD symptoms    Significant family history of melanoma in mother, brother, paternal grandfather  of liver problems in his 70s    Denies smoking, drinks 2 glasses of wine per night, denies any illicit drug use(1)        Medication reviewed in e-chart  Patient is monitored for medication toxicity  labs reviewed and interpreted independently, X rays independently reviewed  Notes from other physicians involved in care were reviewed    Charting was completed using voice recognition technology and may include unintended errors.    ADELINE ELAM MD, TERECNE.    Max Orourke Master Clinician in Hematology and Oncology  Medical Director, [facility] at [facility].  [place] office  Phone [phone]  Fax      [phone]  [facility].  Phone [phone]  Fax     [phone]

## 2025-05-06 NOTE — PROGRESS NOTES
"Subjective   Patient ID: Alessandra Beltran is a 59 y.o. female who presents for Follow-up (EP.  Follow up BP.  Labs done in April. Weight loss and stopped alcohol and prediabetic).  HPI    History of Present Illness  Alessandra Beltran is a 59 year old female with hemochromatosis hyperlipidemia IBS who presents for a regular follow-up appointment.    She has a history of hemochromatosis with recent ferritin levels decreasing from 304 to 114 and an iron level of 110. She recalls a possible viral infection in January but not at the time of the blood work.  She had a low white blood cell count on his most recent blood work.  Of note she sees her hematologist today for follow-up of hemochromatosis    Her fasting blood sugar is 101, and her A1c is 5.9, indicating prediabetes. She has lost 27 pounds, surpassing her weight loss goal, and is now underweight by two points on the BMI scale. She attributes her blood sugar levels to genetics, as her mother is insulin-dependent and her brother, who is fit and exercises regularly, is on metformin for diabetes.  She is aware that she can lose no more weight as she is already underweight    She has abstained from alcohol for five months, along with her , Alden. She spent a month in [place] from early February to early March, engaging in daily walks and biking over eighty miles during her stay. She plans to extend her stay next year.    She discusses her health insurance situation, noting that she will be on a [employer] plan through [facility], which does not cover pharmacy benefits, and she will be paying a high premium until she qualifies for Medicare in four and a half years.    Review of Systems  Review of systems was performed and is otherwise negative except as noted in HPI.      Objective   /84   Pulse 77   Temp 36.8 °C (98.3 °F) (Oral)   Ht 1.74 m (5' 8.5\")   Wt 55.8 kg (123 lb)   SpO2 97%   BMI 18.43 kg/m²      Physical Exam  HEENT is normal  Lungs " clear bilaterally  Heart is regular rate rhythm no murmurs  Abdomen benign  Lower extremities no edema     Assessment/Plan   There are no diagnoses linked to this encounter.  Assessment & Plan  Underweight  BMI is underweight by two points, with a 27-pound weight loss. Risk of bone and muscle loss, potentially leading to osteoporosis. Advised against further weight loss due to these health risks.  - Advise no further weight loss  - Encourage maintenance of current weight for slight increase    Prediabetes  A1c is 5.9, indicating prediabetes. Family history includes mother with insulin-dependent diabetes and brother on metformin. Weight loss is unlikely to further improve glycemic control due to genetic factors. Current lifestyle changes, including weight loss and exercise, are adequate.  - Monitor A1c and blood glucose levels  - Advise against further weight loss  Follow-up with me in 6 months    Leukopenia  Low white blood cell and neutrophil counts with no current illness or infection. Monitoring planned due to potential bone marrow cycle effects. No specific contributing factors identified.  - Recheck CBC in 6-8 weeks if Dr. Lee  is in agreement otherwise he will determine    Hemochromatosis gene carrier  Carrier of the hemochromatosis gene. Ferritin decreased from 304 to 114, and iron level is 110. Ongoing monitoring by hematologist. Current lab results to be reviewed by hematologist.  - Provide current lab results to hematologist for review  - Follow up with hematologist as scheduled    Charity Mccallum MD  This medical note was created with the assistance of artificial intelligence (AI) for documentation purposes. The content has been reviewed and confirmed by the healthcare provider for accuracy and completeness. Patient consented to the use of audio recording and use of AI during their visit.

## 2025-05-07 ENCOUNTER — SOCIAL WORK (OUTPATIENT)
Dept: HEMATOLOGY/ONCOLOGY | Facility: CLINIC | Age: 60
End: 2025-05-07
Payer: COMMERCIAL

## 2025-05-07 NOTE — PROGRESS NOTES
This  attempted to call this pt to follow up/assist due to she is changing from [employer] to a [employer] Plan.     The SW left a vm asking for a return phone call to: [phone] or Today at: [phone] ([employer] office Wednesdays).     The pt can call [employer] at: [phone]. [facility] are out of network with .     **The pt called back and spoke to the . She reports her [employer] will end as of 5/31/25. She wanted to confirm that  accepts [facility]. She reports she is talking to an  who is assisting her. The SW confirmed that  takes [facility]. (Confirmed by check in staff).   The pt reports she is saddened because her Specialist physician through [facility] does not accept any Market[employer] plans that are available to the pt.   The pt will call her  and get the process completed to pick her new insurance plan.

## 2025-07-01 DIAGNOSIS — E78.5 HYPERLIPIDEMIA, UNSPECIFIED HYPERLIPIDEMIA TYPE: ICD-10-CM

## 2025-07-01 DIAGNOSIS — J30.9 ALLERGIC RHINITIS, UNSPECIFIED SEASONALITY, UNSPECIFIED TRIGGER: ICD-10-CM

## 2025-07-01 DIAGNOSIS — I10 PRIMARY HYPERTENSION: ICD-10-CM

## 2025-07-01 RX ORDER — LOSARTAN POTASSIUM 50 MG/1
50 TABLET ORAL DAILY
Qty: 90 TABLET | Refills: 0 | Status: SHIPPED | OUTPATIENT
Start: 2025-07-01 | End: 2025-09-29

## 2025-07-01 RX ORDER — MONTELUKAST SODIUM 10 MG/1
10 TABLET ORAL DAILY
Qty: 90 TABLET | Refills: 0 | Status: SHIPPED | OUTPATIENT
Start: 2025-07-01

## 2025-07-01 RX ORDER — ATORVASTATIN CALCIUM 10 MG/1
10 TABLET, FILM COATED ORAL DAILY
Qty: 90 TABLET | Refills: 0 | Status: SHIPPED | OUTPATIENT
Start: 2025-07-01

## 2025-07-01 NOTE — TELEPHONE ENCOUNTER
Alessandra called for refills: [facility]     Losartan 50 mg  Atorvastatin 10 mg  Montelukast 10 mg    [facility]    Last appt: 05/06/25  Next appt: 08/27/25  Labs: 05/06/25

## 2025-07-09 ENCOUNTER — HOSPITAL ENCOUNTER (OUTPATIENT)
Dept: RADIOLOGY | Facility: CLINIC | Age: 60
Discharge: HOME | End: 2025-07-09
Payer: COMMERCIAL

## 2025-07-09 VITALS — BODY MASS INDEX: 17.92 KG/M2 | WEIGHT: 121 LBS | HEIGHT: 69 IN

## 2025-07-09 DIAGNOSIS — Z12.31 ENCOUNTER FOR SCREENING MAMMOGRAM FOR MALIGNANT NEOPLASM OF BREAST: ICD-10-CM

## 2025-07-09 PROCEDURE — 77067 SCR MAMMO BI INCL CAD: CPT

## 2025-07-09 PROCEDURE — 77067 SCR MAMMO BI INCL CAD: CPT | Performed by: RADIOLOGY

## 2025-07-09 PROCEDURE — 77063 BREAST TOMOSYNTHESIS BI: CPT | Performed by: RADIOLOGY

## 2025-07-15 ENCOUNTER — OFFICE VISIT (OUTPATIENT)
Dept: CARDIOLOGY | Facility: CLINIC | Age: 60
End: 2025-07-15
Payer: COMMERCIAL

## 2025-07-15 VITALS
DIASTOLIC BLOOD PRESSURE: 75 MMHG | SYSTOLIC BLOOD PRESSURE: 115 MMHG | HEIGHT: 69 IN | HEART RATE: 75 BPM | OXYGEN SATURATION: 98 % | BODY MASS INDEX: 18.13 KG/M2

## 2025-07-15 DIAGNOSIS — E78.2 MIXED HYPERLIPIDEMIA: Primary | ICD-10-CM

## 2025-07-15 DIAGNOSIS — I10 PRIMARY HYPERTENSION: ICD-10-CM

## 2025-07-15 PROBLEM — J01.90 ACUTE SINUSITIS: Status: RESOLVED | Noted: 2023-10-30 | Resolved: 2025-07-15

## 2025-07-15 PROCEDURE — 99212 OFFICE O/P EST SF 10 MIN: CPT

## 2025-07-15 PROCEDURE — 3078F DIAST BP <80 MM HG: CPT | Performed by: STUDENT IN AN ORGANIZED HEALTH CARE EDUCATION/TRAINING PROGRAM

## 2025-07-15 PROCEDURE — 3074F SYST BP LT 130 MM HG: CPT | Performed by: STUDENT IN AN ORGANIZED HEALTH CARE EDUCATION/TRAINING PROGRAM

## 2025-07-15 PROCEDURE — 1036F TOBACCO NON-USER: CPT | Performed by: STUDENT IN AN ORGANIZED HEALTH CARE EDUCATION/TRAINING PROGRAM

## 2025-07-15 PROCEDURE — 99213 OFFICE O/P EST LOW 20 MIN: CPT | Performed by: STUDENT IN AN ORGANIZED HEALTH CARE EDUCATION/TRAINING PROGRAM

## 2025-07-15 PROCEDURE — 93005 ELECTROCARDIOGRAM TRACING: CPT | Performed by: STUDENT IN AN ORGANIZED HEALTH CARE EDUCATION/TRAINING PROGRAM

## 2025-07-15 ASSESSMENT — PAIN SCALES - GENERAL: PAINLEVEL_OUTOF10: 0-NO PAIN

## 2025-07-15 NOTE — PROGRESS NOTES
"Chief Complaint:   Follow-up     History Of Present Illness:    Alessandra Beltran is a 59 y.o. female presents for yearly appointment.  Doing well since last appoint.  Denies chest pain or shortness of breath.  Compliant current medical therapy.  No lightheadedness dizziness or syncopal events.  Most recent .  Blood pressure is well-controlled at 115/75.  Baseline ECG of normal sinus rhythm.     Last Recorded Vitals:  Vitals:    07/15/25 1049   BP: 115/75   Pulse: 75   SpO2: 98%   Height: 1.74 m (5' 8.5\")       Review of Systems  ROS      Allergies:  Amoxicillin, Bee venom protein (honey bee), and Erythromycin    Outpatient Medications:  Current Outpatient Medications   Medication Instructions    atorvastatin (LIPITOR) 10 mg, oral, Daily    calcium carbonate 600 mg, As needed    cholecalciferol (VITAMIN D-3) 2,000 Units, Daily    cyanocobalamin, vitamin B-12, (Vitamin B-12) 1,000 mcg tablet extended release 1 tablet, Daily    dicyclomine (BENTYL) 20 mg, oral, 3 times daily    loratadine 10 mg, Daily    losartan (COZAAR) 50 mg, oral, Daily    mometasone (Nasonex) 50 mcg/actuation nasal spray 1 spray, Daily    montelukast (SINGULAIR) 10 mg, oral, Daily       Physical Exam:  General: No acute distress,  A&O x3  Skin: Warm and dry  Neck: JVD is not elevated  ENT: Moist mucous membranes no lesions appreciated  Pulmonary: CTAB  Cards: Regular rate rhythm, no murmurs gallops or rubs appreciated normal S1-S2  Abdomen: Soft nontender nondistended  Extremities: No edema or cyanosis  Psych: Appropriate mood and affect        Last Labs:  CBC -  Lab Results   Component Value Date    WBC 3.0 (L) 04/24/2025    HGB 13.9 04/24/2025    HCT 42.0 04/24/2025    MCV 96.1 04/24/2025     04/24/2025       CMP -  Lab Results   Component Value Date    CALCIUM 9.6 04/24/2025    PROT 6.2 04/24/2025    ALBUMIN 4.5 04/24/2025    AST 11 04/24/2025    ALT 13 04/24/2025    ALKPHOS 46 04/24/2025    BILITOT 0.7 04/24/2025 "       LIPID PANEL -   Lab Results   Component Value Date    CHOL 187 04/24/2025    TRIG 48 04/24/2025    HDL 71 04/24/2025    CHHDL 2.6 04/24/2025    LDLF 120 (H) 07/21/2023    VLDL 17 12/11/2024    NHDL 116 04/24/2025       RENAL FUNCTION PANEL -   Lab Results   Component Value Date    GLUCOSE 101 (H) 04/24/2025     04/24/2025    K 4.1 04/24/2025     04/24/2025    CO2 30 04/24/2025    ANIONGAP 7 04/24/2025    BUN 14 04/24/2025    CREATININE 0.71 04/24/2025    CALCIUM 9.6 04/24/2025    ALBUMIN 4.5 04/24/2025        Lab Results   Component Value Date    HGBA1C 5.9 (H) 04/24/2025       Last Cardiology Tests:  ECG:  No results found for this or any previous visit (from the past 4464 hours).     Echo:  No results found for this or any previous visit from the past 1095 days.     Assessment and Plan    1.  History of syncope: Probably orthostatic in the setting of diarrhea poor oral intake from viral/bacterial illness.  Baseline ECG is sinus rhythm normal axis and appropriate R wave progression.  No subsequent events.     2.  Hypertension: Substantially improved with medication adjustment and cessation of alcohol.   Echo shows normal ejection fraction significant valvular pathology.  -Continue losartan 50 mg once a day     3. hyperlipidemia: LDL of 102.  Patient is currently on atorvastatin 10 mg.  Calcium score 0    Baseline ECG of sinus rhythm normal axis with appropriately progression.     Follow-up as needed     (This note was generated with voice recognition software and may contain errors including spelling, grammar, syntax and missed recognition of what was dictated, of which may not have been fully corrected)     Ector Contreras MD PhD

## 2025-07-16 LAB
ATRIAL RATE: 64 BPM
P AXIS: 48 DEGREES
P OFFSET: 192 MS
P ONSET: 147 MS
PR INTERVAL: 144 MS
Q ONSET: 219 MS
QRS COUNT: 10 BEATS
QRS DURATION: 88 MS
QT INTERVAL: 402 MS
QTC CALCULATION(BAZETT): 414 MS
QTC FREDERICIA: 410 MS
R AXIS: 78 DEGREES
T AXIS: 46 DEGREES
T OFFSET: 420 MS
VENTRICULAR RATE: 64 BPM

## 2025-07-25 DIAGNOSIS — R73.01 FASTING HYPERGLYCEMIA: ICD-10-CM

## 2025-07-25 DIAGNOSIS — E78.2 MIXED HYPERLIPIDEMIA: ICD-10-CM

## 2025-07-25 DIAGNOSIS — I10 PRIMARY HYPERTENSION: ICD-10-CM

## 2025-07-28 ENCOUNTER — APPOINTMENT (OUTPATIENT)
Dept: LAB | Facility: HOSPITAL | Age: 60
End: 2025-07-28
Payer: COMMERCIAL

## 2025-07-28 DIAGNOSIS — E83.110 HEREDITARY HEMOCHROMATOSIS: Primary | ICD-10-CM

## 2025-07-28 LAB
ALBUMIN SERPL-MCNC: 4.6 G/DL (ref 3.6–5.1)
ALP SERPL-CCNC: 52 U/L (ref 37–153)
ALT SERPL-CCNC: 16 U/L (ref 6–29)
ANION GAP SERPL CALCULATED.4IONS-SCNC: 5 MMOL/L (CALC) (ref 7–17)
AST SERPL-CCNC: 13 U/L (ref 10–35)
BILIRUB SERPL-MCNC: 0.6 MG/DL (ref 0.2–1.2)
BUN SERPL-MCNC: 15 MG/DL (ref 7–25)
CALCIUM SERPL-MCNC: 9.8 MG/DL (ref 8.6–10.4)
CHLORIDE SERPL-SCNC: 105 MMOL/L (ref 98–110)
CHOLEST SERPL-MCNC: 208 MG/DL
CHOLEST/HDLC SERPL: 2.5 (CALC)
CO2 SERPL-SCNC: 31 MMOL/L (ref 20–32)
CREAT SERPL-MCNC: 0.78 MG/DL (ref 0.5–1.05)
EGFRCR SERPLBLD CKD-EPI 2021: 87 ML/MIN/1.73M2
GLUCOSE SERPL-MCNC: 112 MG/DL (ref 65–99)
HDLC SERPL-MCNC: 82 MG/DL
LDLC SERPL CALC-MCNC: 112 MG/DL (CALC)
NONHDLC SERPL-MCNC: 126 MG/DL (CALC)
POTASSIUM SERPL-SCNC: 4.2 MMOL/L (ref 3.5–5.3)
PROT SERPL-MCNC: 6.6 G/DL (ref 6.1–8.1)
SODIUM SERPL-SCNC: 141 MMOL/L (ref 135–146)
TRIGL SERPL-MCNC: 55 MG/DL

## 2025-07-28 PROCEDURE — 83615 LACTATE (LD) (LDH) ENZYME: CPT

## 2025-07-28 PROCEDURE — 82746 ASSAY OF FOLIC ACID SERUM: CPT

## 2025-07-28 PROCEDURE — 86803 HEPATITIS C AB TEST: CPT

## 2025-07-28 PROCEDURE — 82607 VITAMIN B-12: CPT

## 2025-07-28 PROCEDURE — 84443 ASSAY THYROID STIM HORMONE: CPT

## 2025-07-28 PROCEDURE — 86038 ANTINUCLEAR ANTIBODIES: CPT

## 2025-07-28 PROCEDURE — 85025 COMPLETE CBC W/AUTO DIFF WBC: CPT

## 2025-07-28 PROCEDURE — 86431 RHEUMATOID FACTOR QUANT: CPT

## 2025-07-29 ENCOUNTER — TELEPHONE (OUTPATIENT)
Dept: HEMATOLOGY/ONCOLOGY | Facility: CLINIC | Age: 60
End: 2025-07-29
Payer: COMMERCIAL

## 2025-07-29 ENCOUNTER — LAB (OUTPATIENT)
Dept: LAB | Facility: HOSPITAL | Age: 60
End: 2025-07-29
Payer: COMMERCIAL

## 2025-07-29 DIAGNOSIS — D70.9 NEUTROPENIA, UNSPECIFIED TYPE: ICD-10-CM

## 2025-07-29 DIAGNOSIS — E83.110 HEREDITARY HEMOCHROMATOSIS: ICD-10-CM

## 2025-07-29 LAB
BASOPHILS # BLD AUTO: 0.03 X10*3/UL (ref 0–0.1)
BASOPHILS NFR BLD AUTO: 0.4 %
EOSINOPHIL # BLD AUTO: 0.12 X10*3/UL (ref 0–0.7)
EOSINOPHIL NFR BLD AUTO: 1.5 %
ERYTHROCYTE [DISTWIDTH] IN BLOOD BY AUTOMATED COUNT: 14.8 % (ref 11.5–14.5)
FOLATE SERPL-MCNC: 7.6 NG/ML
HCT VFR BLD AUTO: 24.9 % (ref 36–46)
HCV AB SER QL: NONREACTIVE
HGB BLD-MCNC: 7 G/DL (ref 12–16)
IMM GRANULOCYTES # BLD AUTO: 0.05 X10*3/UL (ref 0–0.7)
IMM GRANULOCYTES NFR BLD AUTO: 0.6 % (ref 0–0.9)
LDH SERPL L TO P-CCNC: 148 U/L (ref 84–246)
LYMPHOCYTES # BLD AUTO: 0.69 X10*3/UL (ref 1.2–4.8)
LYMPHOCYTES NFR BLD AUTO: 8.6 %
MCH RBC QN AUTO: 26.7 PG (ref 26–34)
MCHC RBC AUTO-ENTMCNC: 28.1 G/DL (ref 32–36)
MCV RBC AUTO: 95 FL (ref 80–100)
MONOCYTES # BLD AUTO: 0.52 X10*3/UL (ref 0.1–1)
MONOCYTES NFR BLD AUTO: 6.5 %
NEUTROPHILS # BLD AUTO: 6.61 X10*3/UL (ref 1.2–7.7)
NEUTROPHILS NFR BLD AUTO: 82.4 %
NRBC BLD-RTO: 0 /100 WBCS (ref 0–0)
PLATELET # BLD AUTO: 163 X10*3/UL (ref 150–450)
RBC # BLD AUTO: 2.62 X10*6/UL (ref 4–5.2)
RHEUMATOID FACT SER NEPH-ACNC: 11 IU/ML (ref 0–15)
TSH SERPL-ACNC: 1.57 MIU/L (ref 0.44–3.98)
VIT B12 SERPL-MCNC: 1292 PG/ML (ref 211–911)
WBC # BLD AUTO: 8 X10*3/UL (ref 4.4–11.3)

## 2025-07-29 NOTE — TELEPHONE ENCOUNTER
Spoke to Stefania and informed Dr. Chamorro on 5/6/25 entered all lab orders that can be used. Stefania states she has 2 tubes (lavendar and SST) and will send tubes for orders that are entered.  Stefania states if there is not enough specimen for all tests the providers office will be notified.

## 2025-07-29 NOTE — TELEPHONE ENCOUNTER
Reason for Conversation  Needs lab order in Epic    Background   Stefania Braden from [facility]  lab called [facility] sent them labs but they need internal lab orders in Epic  Cmp, iron study and Ferritin. Any questions can call her or secure chat her Phone # [phone]    Disposition   No disposition on file.

## 2025-07-29 NOTE — TELEPHONE ENCOUNTER
"Per physician \"I talked to the patient today, she had difficulty with the blood draw and she had 3-4 attempts by 2 different phlebotomies at [facility] and later patient passed out/vasovagal syncope and she had 2 blood tubes were drawn out of 4 was requested and but now patient is feeling back to normal and she does not have any bleeding from anywhere.  She was supposed to go back there to have blood redrawn on Friday at [facility].  I have explained to the patient that her hemoglobin was always normal and now her hemoglobin is 7, most likely it is a lab error due to difficult blood draw and possible clotting, I have advised her to come to [place] tomorrow to have blood drawn repeated.  Patient has agreed.     Rudy  Can you please call her and set her appointment for blood draw at [place] tomorrow please\"    Call placed by RN to patient to discuss.  Patient denies any S&S of anemia at this time.  States she is an avid walker and walked today and is heading to dinner with friends right now.  States Denzel had difficulty obtaining labs on her and she had a syncope episode.  States she was tired the evening after she fainted however \"bounced right back the next day\".   Patient agreeable to come tomorrow for CBC only and will come to [facility] around 8:00 AM for the draw.  Patient is aware RN will call her again to review these results tomorrow.  Call back instructions reviewed.  Patient verbalized understanding.       "

## 2025-07-30 ENCOUNTER — LAB (OUTPATIENT)
Dept: LAB | Facility: CLINIC | Age: 60
End: 2025-07-30
Payer: COMMERCIAL

## 2025-07-30 DIAGNOSIS — E83.110 HEREDITARY HEMOCHROMATOSIS: ICD-10-CM

## 2025-07-30 LAB
ANA SER QL HEP2 SUBST: NEGATIVE
BASOPHILS # BLD AUTO: 0.03 X10*3/UL (ref 0–0.1)
BASOPHILS NFR BLD AUTO: 0.7 %
EOSINOPHIL # BLD AUTO: 0.11 X10*3/UL (ref 0–0.7)
EOSINOPHIL NFR BLD AUTO: 2.6 %
ERYTHROCYTE [DISTWIDTH] IN BLOOD BY AUTOMATED COUNT: 12.7 % (ref 11.5–14.5)
HCT VFR BLD AUTO: 40.7 % (ref 36–46)
HGB BLD-MCNC: 13.6 G/DL (ref 12–16)
IMM GRANULOCYTES # BLD AUTO: 0 X10*3/UL (ref 0–0.7)
IMM GRANULOCYTES NFR BLD AUTO: 0 % (ref 0–0.9)
LYMPHOCYTES # BLD AUTO: 2.41 X10*3/UL (ref 1.2–4.8)
LYMPHOCYTES NFR BLD AUTO: 56.8 %
MCH RBC QN AUTO: 31.9 PG (ref 26–34)
MCHC RBC AUTO-ENTMCNC: 33.4 G/DL (ref 32–36)
MCV RBC AUTO: 95 FL (ref 80–100)
MONOCYTES # BLD AUTO: 0.37 X10*3/UL (ref 0.1–1)
MONOCYTES NFR BLD AUTO: 8.7 %
NEUTROPHILS # BLD AUTO: 1.32 X10*3/UL (ref 1.2–7.7)
NEUTROPHILS NFR BLD AUTO: 31.2 %
NRBC BLD-RTO: ABNORMAL /100{WBCS}
PLATELET # BLD AUTO: 190 X10*3/UL (ref 150–450)
RBC # BLD AUTO: 4.27 X10*6/UL (ref 4–5.2)
WBC # BLD AUTO: 4.2 X10*3/UL (ref 4.4–11.3)

## 2025-07-30 PROCEDURE — 36415 COLL VENOUS BLD VENIPUNCTURE: CPT

## 2025-07-30 PROCEDURE — 85025 COMPLETE CBC W/AUTO DIFF WBC: CPT

## 2025-07-30 NOTE — TELEPHONE ENCOUNTER
Repeat CBC from 7/30 reviewed by RN.  Noted Hgb 13.6.  Call placed to patient to discuss.  No answer.  Message left on identifiable voicemail with call back instructions.

## 2025-07-30 NOTE — TELEPHONE ENCOUNTER
"Call returned by patient.  Reviewed labs by phone.  Patient requesting to bump FUV to December.  Per providers note \"Tentative follow-up in first week of August 2025, if her ANC is normal, we can push it but otherwise I will see her that time\"  ANC 1320 today.  Will discuss with provider and follow-up.  Call back instructions reviewed.  Patient verbalized understanding.     "

## 2025-07-31 LAB — HOLD SPECIMEN: NORMAL

## 2025-08-01 NOTE — TELEPHONE ENCOUNTER
OK per provider to bump to December.  Message forwarded to scheduling to reach out to patient to [facility] appt as requested.

## 2025-08-05 ENCOUNTER — APPOINTMENT (OUTPATIENT)
Dept: HEMATOLOGY/ONCOLOGY | Facility: CLINIC | Age: 60
End: 2025-08-05
Payer: COMMERCIAL

## 2025-08-19 ENCOUNTER — TELEPHONE (OUTPATIENT)
Dept: PRIMARY CARE | Facility: CLINIC | Age: 60
End: 2025-08-19
Payer: COMMERCIAL

## 2025-08-25 LAB
EST. AVERAGE GLUCOSE BLD GHB EST-MCNC: 126 MG/DL
EST. AVERAGE GLUCOSE BLD GHB EST-SCNC: 7 MMOL/L
HBA1C MFR BLD: 6 %

## 2025-08-27 ENCOUNTER — APPOINTMENT (OUTPATIENT)
Dept: PRIMARY CARE | Facility: CLINIC | Age: 60
End: 2025-08-27
Payer: COMMERCIAL

## 2025-08-27 VITALS
WEIGHT: 124 LBS | DIASTOLIC BLOOD PRESSURE: 72 MMHG | TEMPERATURE: 97.8 F | SYSTOLIC BLOOD PRESSURE: 112 MMHG | HEART RATE: 54 BPM | HEIGHT: 69 IN | OXYGEN SATURATION: 97 % | BODY MASS INDEX: 18.37 KG/M2

## 2025-08-27 DIAGNOSIS — E78.5 HYPERLIPIDEMIA, UNSPECIFIED HYPERLIPIDEMIA TYPE: ICD-10-CM

## 2025-08-27 DIAGNOSIS — J30.9 ALLERGIC RHINITIS, UNSPECIFIED SEASONALITY, UNSPECIFIED TRIGGER: ICD-10-CM

## 2025-08-27 DIAGNOSIS — Z00.00 ROUTINE GENERAL MEDICAL EXAMINATION AT A HEALTH CARE FACILITY: Primary | ICD-10-CM

## 2025-08-27 DIAGNOSIS — I10 PRIMARY HYPERTENSION: ICD-10-CM

## 2025-08-27 PROCEDURE — 3008F BODY MASS INDEX DOCD: CPT | Performed by: INTERNAL MEDICINE

## 2025-08-27 PROCEDURE — 3074F SYST BP LT 130 MM HG: CPT | Performed by: INTERNAL MEDICINE

## 2025-08-27 PROCEDURE — 1036F TOBACCO NON-USER: CPT | Performed by: INTERNAL MEDICINE

## 2025-08-27 PROCEDURE — 99214 OFFICE O/P EST MOD 30 MIN: CPT | Performed by: INTERNAL MEDICINE

## 2025-08-27 PROCEDURE — 3078F DIAST BP <80 MM HG: CPT | Performed by: INTERNAL MEDICINE

## 2025-08-27 RX ORDER — ATORVASTATIN CALCIUM 20 MG/1
20 TABLET, FILM COATED ORAL DAILY
Qty: 90 TABLET | Refills: 1 | Status: SHIPPED | OUTPATIENT
Start: 2025-08-27 | End: 2026-02-23

## 2025-08-27 RX ORDER — LOSARTAN POTASSIUM 50 MG/1
50 TABLET ORAL DAILY
Qty: 90 TABLET | Refills: 1 | Status: SHIPPED | OUTPATIENT
Start: 2025-08-27 | End: 2026-02-23

## 2025-08-27 RX ORDER — BUTYROSPERMUM PARKII(SHEA BUTTER), SIMMONDSIA CHINENSIS (JOJOBA) SEED OIL, ALOE BARBADENSIS LEAF EXTRACT .01; 1; 3.5 G/100G; G/100G; G/100G
250 LIQUID TOPICAL 2 TIMES DAILY
COMMUNITY

## 2025-08-27 RX ORDER — ATORVASTATIN CALCIUM 10 MG/1
10 TABLET, FILM COATED ORAL DAILY
Qty: 90 TABLET | Refills: 1 | Status: CANCELLED | OUTPATIENT
Start: 2025-08-27

## 2025-08-27 RX ORDER — MONTELUKAST SODIUM 10 MG/1
10 TABLET ORAL DAILY
Qty: 90 TABLET | Refills: 1 | Status: SHIPPED | OUTPATIENT
Start: 2025-08-27

## 2025-08-27 ASSESSMENT — ENCOUNTER SYMPTOMS
DEPRESSION: 0
OCCASIONAL FEELINGS OF UNSTEADINESS: 0
LOSS OF SENSATION IN FEET: 0

## 2025-09-09 ENCOUNTER — APPOINTMENT (OUTPATIENT)
Dept: DERMATOLOGY | Facility: CLINIC | Age: 60
End: 2025-09-09
Payer: COMMERCIAL

## 2025-12-02 ENCOUNTER — APPOINTMENT (OUTPATIENT)
Dept: PRIMARY CARE | Facility: CLINIC | Age: 60
End: 2025-12-02
Payer: COMMERCIAL